# Patient Record
Sex: FEMALE | Race: WHITE | NOT HISPANIC OR LATINO | Employment: OTHER | URBAN - METROPOLITAN AREA
[De-identification: names, ages, dates, MRNs, and addresses within clinical notes are randomized per-mention and may not be internally consistent; named-entity substitution may affect disease eponyms.]

---

## 2017-03-20 PROBLEM — M47.812 OSTEOARTHRITIS OF CERVICAL SPINE: Status: ACTIVE | Noted: 2017-03-20

## 2017-03-20 PROBLEM — E89.0 HISTORY OF PARTIAL THYROIDECTOMY: Status: ACTIVE | Noted: 2017-03-20

## 2017-03-20 PROBLEM — R09.89 CHOKING SENSATION: Status: ACTIVE | Noted: 2017-03-20

## 2017-03-20 PROBLEM — R63.2 INCREASED APPETITE: Status: ACTIVE | Noted: 2017-03-20

## 2017-03-20 PROBLEM — E04.1 THYROID NODULE: Status: ACTIVE | Noted: 2017-03-20

## 2017-03-30 PROBLEM — J02.9 SORE THROAT: Status: ACTIVE | Noted: 2017-03-30

## 2017-03-30 PROBLEM — R49.0 HOARSENESS OF VOICE: Status: ACTIVE | Noted: 2017-03-30

## 2017-03-30 PROBLEM — J34.89 RHINORRHEA: Status: ACTIVE | Noted: 2017-03-30

## 2017-03-30 PROBLEM — J34.89 SINUS PRESSURE: Status: ACTIVE | Noted: 2017-03-30

## 2017-03-30 PROBLEM — J04.0 LARYNGITIS: Status: ACTIVE | Noted: 2017-03-30

## 2017-04-18 PROBLEM — J34.89 SINUS PRESSURE: Status: RESOLVED | Noted: 2017-03-30 | Resolved: 2017-04-18

## 2017-04-18 PROBLEM — J04.0 LARYNGITIS: Status: RESOLVED | Noted: 2017-03-30 | Resolved: 2017-04-18

## 2017-04-18 PROBLEM — R49.0 HOARSENESS OF VOICE: Status: RESOLVED | Noted: 2017-03-30 | Resolved: 2017-04-18

## 2017-04-18 PROBLEM — J34.89 RHINORRHEA: Status: RESOLVED | Noted: 2017-03-30 | Resolved: 2017-04-18

## 2017-04-18 PROBLEM — J02.9 SORE THROAT: Status: RESOLVED | Noted: 2017-03-30 | Resolved: 2017-04-18

## 2017-11-02 PROBLEM — R63.2 INCREASED APPETITE: Status: RESOLVED | Noted: 2017-03-20 | Resolved: 2017-11-02

## 2019-03-20 ENCOUNTER — HOSPITAL ENCOUNTER (OUTPATIENT)
Dept: RADIOLOGY | Facility: HOSPITAL | Age: 64
Discharge: HOME | End: 2019-03-20
Attending: NURSE PRACTITIONER
Payer: COMMERCIAL

## 2019-03-20 VITALS — WEIGHT: 125 LBS | BODY MASS INDEX: 23 KG/M2 | HEIGHT: 62 IN

## 2019-03-20 DIAGNOSIS — Z12.39 ENCOUNTER FOR SCREENING FOR MALIGNANT NEOPLASM OF BREAST: ICD-10-CM

## 2019-03-20 PROCEDURE — 77067 SCR MAMMO BI INCL CAD: CPT | Mod: TC

## 2019-03-20 PROCEDURE — 77067 SCR MAMMO BI INCL CAD: CPT | Mod: 26,,, | Performed by: RADIOLOGY

## 2019-03-20 PROCEDURE — 77067 MAMMO DIGITAL SCREENING BILAT WITH CAD: ICD-10-PCS | Mod: 26,,, | Performed by: RADIOLOGY

## 2019-04-02 ENCOUNTER — HOSPITAL ENCOUNTER (OUTPATIENT)
Dept: RADIOLOGY | Facility: HOSPITAL | Age: 64
Discharge: HOME | End: 2019-04-02
Attending: NURSE PRACTITIONER
Payer: COMMERCIAL

## 2019-04-02 DIAGNOSIS — E04.1 THYROID NODULE: ICD-10-CM

## 2019-04-02 PROCEDURE — 76536 US THYROID: ICD-10-PCS | Mod: 26,,, | Performed by: RADIOLOGY

## 2019-04-02 PROCEDURE — 76536 US EXAM OF HEAD AND NECK: CPT | Mod: TC

## 2019-04-02 PROCEDURE — 76536 US EXAM OF HEAD AND NECK: CPT | Mod: 26,,, | Performed by: RADIOLOGY

## 2019-05-23 ENCOUNTER — OFFICE VISIT (OUTPATIENT)
Dept: SURGERY | Facility: CLINIC | Age: 64
End: 2019-05-23
Payer: COMMERCIAL

## 2019-05-23 VITALS
HEIGHT: 62 IN | HEART RATE: 75 BPM | SYSTOLIC BLOOD PRESSURE: 101 MMHG | RESPIRATION RATE: 18 BRPM | WEIGHT: 128.5 LBS | DIASTOLIC BLOOD PRESSURE: 57 MMHG | TEMPERATURE: 97 F | OXYGEN SATURATION: 96 % | BODY MASS INDEX: 23.65 KG/M2

## 2019-05-23 DIAGNOSIS — E04.2 MULTIPLE THYROID NODULES: Primary | ICD-10-CM

## 2019-05-23 PROCEDURE — 99205 PR OFFICE/OUTPT VISIT, NEW, LEVL V, 60-74 MIN: ICD-10-PCS | Mod: S$GLB,,, | Performed by: SURGERY

## 2019-05-23 PROCEDURE — 99205 OFFICE O/P NEW HI 60 MIN: CPT | Mod: S$GLB,,, | Performed by: SURGERY

## 2019-05-23 NOTE — LETTER
May 23, 2019      Venus Coronado, NUNU-C  [address]  [place]  [place] [address]           [facility]  [address] [place] [address]  Phone: [phone]  Fax: [phone]          Patient: Liliam Woodson   MR Number: [id number]   YOB: 1955   Date of Visit: 5/23/2019       Dear Venus Coronado:    Thank you for referring Liliam Woodson to me for evaluation. Attached you will find relevant portions of my assessment and plan of care.    If you have questions, please do not hesitate to call me. I look forward to following Liliam Woodson along with you.    Sincerely,    El Leonard MD    Enclosure  CC:  No Recipients    If you would like to receive this communication electronically, please contact [email or web address] or [phone] to request more information on [employer] access.    For providers and/or their staff who would like to refer a patient to [facility], please contact us through our one-stop-shop provider referral line, [facility] , at [phone].    If you feel you have received this communication in error or would no longer like to receive these types of communications, please e-mail [email or web address]

## 2019-05-23 NOTE — PROGRESS NOTES
Subjective:       Patient ID: Liliam Woodson is a 64 y.o. female.    Chief Complaint: Consult (Referral-Cliff,NP-Multiple Thyroid Nodules)      HPI:  Ms. Woodson presents today as a consult from Venus FLORES.  She is seen in consultation for an abnormal ultrasound of her thyroid.  She had a left thyroid lobectomy 30 or 40 years ago, pathology unavailable and unknown.  She has been followed with thyroid ultrasounds for known multiple nodules in the right lobe.  Thyroid ultrasound films and report were reviewed from 4/2/2019.  The left lobe is indeed surgically absent.  Multiple nodules are present in the right lobe.  Largest measures 3.2 cm in maximal diameter, this has increased in size.  In 2016 this nodule measured 2.4 cm and in 2017 it measured 2.8 cm She is not experiencing any compressive symptoms.  She is not experiencing any symptoms of hyperthyroidism or hypothyroidism.      Allergies & Meds:  Review of patient's allergies indicates:   Allergen Reactions    Lyrica [pregabalin] Nausea And Vomiting    Pcn [penicillins]        Current Outpatient Medications   Medication Sig Dispense Refill    cholecalciferol, vitamin D3, 1,000 unit capsule Take 2 capsules (2,000 Units total) by mouth once daily.  0    fluticasone-salmeterol 250-50 mcg/dose (ADVAIR DISKUS) 250-50 mcg/dose diskus inhaler INHALE 1 SPRAY BY MOUTH 2 TIMES A DAY ( IN THE MORNING AND IN THE EVENING) FOR ASTHMA 60 each 11    omeprazole (PRILOSEC) 40 MG capsule Take 1 capsule (40 mg total) by mouth once daily. 30 capsule 11    oxycodone-acetaminophen (PERCOCET)  mg per tablet Take 1 tablet by mouth every 12 (twelve) hours as needed for Pain.      sertraline (ZOLOFT) 100 MG tablet Take 1 tablet (100 mg total) by mouth once daily. 30 tablet 5    ALPRAZolam (XANAX) 0.25 MG tablet Take 1 tablet (0.25 mg total) by mouth 2 (two) times daily as needed for Anxiety. 10 tablet 1     No current facility-administered medications for this  "visit.        PMFSHx:  Past Medical History:   Diagnosis Date    Allergy     Asthma     Compression fx, thoracic spine     Osteopenia 02/15/2018    Thyroid disease        Past Surgical History:   Procedure Laterality Date    CATARACT EXTRACTION Right 03/2018    w/ lens implant    CHOLECYSTECTOMY      COLONOSCOPY  7/26/10    "inflammed hyperplastic polyp"    GALLBLADDER SURGERY      KNEE SURGERY      OVARIAN CYST SURGERY      THYROID SURGERY         Family History   Problem Relation Age of Onset    Lupus Mother     Melanoma Neg Hx     Psoriasis Neg Hx     Breast cancer Neg Hx        Social History     Tobacco Use    Smoking status: Never Smoker   Substance Use Topics    Alcohol use: No    Drug use: No       Review of Systems   Constitutional: Negative for appetite change, chills, fatigue, fever and unexpected weight change.   HENT: Negative for congestion, dental problem, ear pain, mouth sores, postnasal drip, rhinorrhea, sore throat, tinnitus, trouble swallowing and voice change.    Eyes: Negative for photophobia, pain, discharge and visual disturbance.   Respiratory: Negative for cough, chest tightness, shortness of breath and wheezing.    Cardiovascular: Negative for chest pain, palpitations and leg swelling.   Gastrointestinal: Negative for abdominal pain, blood in stool, constipation, diarrhea, nausea and vomiting.   Endocrine: Negative for cold intolerance, heat intolerance, polydipsia, polyphagia and polyuria.   Genitourinary: Negative for difficulty urinating, dysuria, flank pain, frequency, hematuria and urgency.   Musculoskeletal: Negative for arthralgias, joint swelling and myalgias.   Skin: Negative for color change and rash.   Allergic/Immunologic: Negative for immunocompromised state.   Neurological: Negative for dizziness, tremors, seizures, syncope, speech difficulty, weakness, numbness and headaches.   Hematological: Negative for adenopathy. Does not bruise/bleed easily. "   Psychiatric/Behavioral: Negative for agitation, confusion, hallucinations, self-injury and suicidal ideas. The patient is not nervous/anxious.        Objective:      Physical Exam   Constitutional: She is oriented to person, place, and time. She appears well-developed and well-nourished. She is active.  Non-toxic appearance. No distress.   Body mass index is 23.5 kg/m².     HENT:   Head: Normocephalic and atraumatic.   Right Ear: Hearing and external ear normal. No drainage or tenderness.   Left Ear: Hearing and external ear normal. No drainage or tenderness.   Nose: Nose normal. No rhinorrhea. No epistaxis.   Mouth/Throat: Uvula is midline, oropharynx is clear and moist and mucous membranes are normal. Mucous membranes are not pale, not dry and not cyanotic. No oropharyngeal exudate.   Eyes: Pupils are equal, round, and reactive to light. Conjunctivae and lids are normal. Right eye exhibits no discharge and no exudate. Left eye exhibits no discharge and no exudate. Right conjunctiva is not injected. Right eye exhibits no nystagmus. Left eye exhibits no nystagmus.   Neck: Trachea normal, full passive range of motion without pain and phonation normal. No JVD present. Carotid bruit is not present. No tracheal deviation present. Thyroid mass (left lobe surgically absent; nodule inferior right lobe (3 cm)) present. No thyromegaly present.   Cardiovascular: Normal rate, regular rhythm, S1 normal, S2 normal, normal heart sounds and intact distal pulses. PMI is not displaced. Exam reveals no gallop and no friction rub.   No murmur heard.  Pulmonary/Chest: Effort normal and breath sounds normal. No accessory muscle usage. No respiratory distress. She exhibits no mass, no tenderness and no crepitus. Right breast exhibits no inverted nipple, no mass, no nipple discharge, no skin change and no tenderness. Left breast exhibits no inverted nipple, no mass, no nipple discharge, no skin change and no tenderness. Breasts are  symmetrical.   Abdominal: Soft. Normal appearance and bowel sounds are normal. She exhibits no distension, no fluid wave, no abdominal bruit and no mass. There is no hepatosplenomegaly. There is no tenderness. There is no rebound, no guarding and negative Claros's sign. No hernia. Hernia confirmed negative in the right inguinal area and confirmed negative in the left inguinal area.   Genitourinary: Rectum normal and vagina normal. There is no rash or lesion on the right labia. There is no rash or lesion on the left labia. Right adnexum displays no mass. Left adnexum displays no mass. No vaginal discharge found.   Musculoskeletal:        Cervical back: Normal.        Thoracic back: Normal.        Lumbar back: Normal.        Right upper arm: Normal.        Left upper arm: Normal.        Right forearm: Normal.        Left forearm: Normal.        Right hand: Normal.        Left hand: Normal.        Right upper leg: Normal.        Left upper leg: Normal.        Right lower leg: Normal.        Left lower leg: Normal.        Right foot: Normal.        Left foot: Normal.   Lymphadenopathy:        Head (right side): No submental, no submandibular and no posterior auricular adenopathy present.        Head (left side): No submental, no submandibular and no posterior auricular adenopathy present.     She has no cervical adenopathy.     She has no axillary adenopathy.        Right: No inguinal and no supraclavicular adenopathy present.        Left: No inguinal and no supraclavicular adenopathy present.   Neurological: She is alert and oriented to person, place, and time. She has normal strength. No cranial nerve deficit or sensory deficit. Coordination and gait normal. GCS eye subscore is 4. GCS verbal subscore is 5. GCS motor subscore is 6.   Skin: Skin is warm, dry and intact. No rash noted. No cyanosis. Nails show no clubbing.   Psychiatric: She has a normal mood and affect. Her speech is normal. Judgment and thought content  normal. Her mood appears not anxious. Her affect is not inappropriate. She is not actively hallucinating. She does not exhibit a depressed mood.         Test Results:  Lab results were reviewed from 2/8/2019.  CBC showed no evidence of leukocytosis, anemia, or thrombocytopenia.  Electrolytes were all in the range of normal. BUN and creatinine showed no evidence of renal dysfunction.  Glucose was minimally elevated.  Liver profile showed no evidence of hepatocellular disease or biliary obstruction.  Hemoglobin A1c shows no evidence of diabetes, 5.0%.  TSH was in the range of normal at 1.11.    Assessment:       Multiple thyroid nodules.  Dominant nodule slowly increasing in size.  Differential diagnosis includes was not limited to adenoma, goiter, cancer, etc    1. Multiple thyroid nodules        Plan:   Multiple thyroid nodules  -     US FNA Biopsy w/ Imaging 1st Lesion; Future; Expected date: 05/23/2019  -     TSH; Future; Expected date: 05/23/2019  -     T3, free; Future; Expected date: 05/23/2019  -     T4, free; Future; Expected date: 05/23/2019        Follow up in about 2 weeks (around 6/6/2019) for results.

## 2019-06-07 ENCOUNTER — HOSPITAL ENCOUNTER (OUTPATIENT)
Dept: RADIOLOGY | Facility: HOSPITAL | Age: 64
Discharge: HOME | End: 2019-06-07
Attending: SURGERY
Payer: COMMERCIAL

## 2019-06-07 VITALS — BODY MASS INDEX: 22.15 KG/M2 | WEIGHT: 125 LBS | HEIGHT: 63 IN

## 2019-06-07 DIAGNOSIS — E04.2 MULTIPLE THYROID NODULES: ICD-10-CM

## 2019-06-07 PROCEDURE — 10021 FNA BX W/O IMG GDN 1ST LES: CPT | Mod: ,,, | Performed by: PATHOLOGY

## 2019-06-07 PROCEDURE — 88173 CYTOLOGY SPECIMEN-FNA-RADIOLOGY ASSISTED WITH PATH ADEQUACY-CORE BX: ICD-10-PCS | Mod: 26,,, | Performed by: PATHOLOGY

## 2019-06-07 PROCEDURE — 10021 CYTOLOGY SPECIMEN-FNA-RADIOLOGY ASSISTED WITH PATH ADEQUACY-CORE BX: ICD-10-PCS | Mod: ,,, | Performed by: PATHOLOGY

## 2019-06-07 PROCEDURE — 88173 CYTOPATH EVAL FNA REPORT: CPT | Mod: 26,,, | Performed by: PATHOLOGY

## 2019-06-07 PROCEDURE — 10005 US FINE NEEDLE ASPIRATION BIOPSY, FIRST LESION: ICD-10-PCS | Mod: ,,, | Performed by: RADIOLOGY

## 2019-06-07 PROCEDURE — 10005 FNA BX W/US GDN 1ST LES: CPT | Mod: ,,, | Performed by: RADIOLOGY

## 2019-06-07 PROCEDURE — 88173 CYTOPATH EVAL FNA REPORT: CPT | Performed by: PATHOLOGY

## 2019-06-07 PROCEDURE — 10005 FNA BX W/US GDN 1ST LES: CPT

## 2019-06-07 NOTE — NURSING
Right thyroid aspiration/specimen collected at this time pathology tech in room with slides ready aspirate placed on slides at this time.

## 2019-06-18 ENCOUNTER — OFFICE VISIT (OUTPATIENT)
Dept: SURGERY | Facility: CLINIC | Age: 64
End: 2019-06-18
Payer: COMMERCIAL

## 2019-06-18 VITALS
TEMPERATURE: 96 F | DIASTOLIC BLOOD PRESSURE: 59 MMHG | BODY MASS INDEX: 23.04 KG/M2 | WEIGHT: 130 LBS | SYSTOLIC BLOOD PRESSURE: 113 MMHG | OXYGEN SATURATION: 97 % | RESPIRATION RATE: 18 BRPM | HEIGHT: 63 IN | HEART RATE: 80 BPM

## 2019-06-18 DIAGNOSIS — E04.1 THYROID NODULE: Primary | ICD-10-CM

## 2019-06-18 PROCEDURE — 99213 PR OFFICE/OUTPT VISIT, EST, LEVL III, 20-29 MIN: ICD-10-PCS | Mod: S$GLB,,, | Performed by: SURGERY

## 2019-06-18 PROCEDURE — 99213 OFFICE O/P EST LOW 20 MIN: CPT | Mod: S$GLB,,, | Performed by: SURGERY

## 2019-06-18 NOTE — PROGRESS NOTES
Subjective:       Patient ID: Liliam Woodson is a 64 y.o. female.    Chief Complaint: Follow-up (Thyroid Bx/Labs)      HPI:  Ms. Woodson returns today with no complaints.  She was seen on 5/23/2019 for an abnormal thyroid ultrasound.  She had an ultrasound in April that showed multiple nodules in the right lobe of her thyroid, largest measures 3.2 cm in maximal diameter.  The left lower thyroid was surgically removed years ago.  The dominant nodule in the right lobe has varied in size but appears to be slowly enlarging.  In the interval since her last visit she had TFTs that indicates she is euthyroid.  She also had FNA biopsy of the dominant nodule that shows benign cytology.  She is not experiencing any symptoms of hyperthyroidism or hypothyroidism.  She is not experiencing any compressive symptoms.    Cytology report reviewed from 6/7/2019 with the above findings confirmed.    TFTs reviewed from 6/7/2019 with the above findings confirmed.      Allergies & Meds:  Review of patient's allergies indicates:   Allergen Reactions    Lyrica [pregabalin] Nausea And Vomiting    Pcn [penicillins] Rash       Current Outpatient Medications   Medication Sig Dispense Refill    cholecalciferol, vitamin D3, 1,000 unit capsule Take 2 capsules (2,000 Units total) by mouth once daily.  0    fluticasone-salmeterol 250-50 mcg/dose (ADVAIR DISKUS) 250-50 mcg/dose diskus inhaler INHALE 1 SPRAY BY MOUTH 2 TIMES A DAY ( IN THE MORNING AND IN THE EVENING) FOR ASTHMA 60 each 11    oxycodone-acetaminophen (PERCOCET)  mg per tablet Take 1 tablet by mouth every 12 (twelve) hours as needed for Pain.      sertraline (ZOLOFT) 100 MG tablet Take 1 tablet (100 mg total) by mouth once daily. 30 tablet 5    ALPRAZolam (XANAX) 0.25 MG tablet Take 1 tablet (0.25 mg total) by mouth 2 (two) times daily as needed for Anxiety. 10 tablet 1     No current facility-administered medications for this visit.        PMFSHx:  Past medical history,  surgical history, family history, social history reviewed and no changes noted.        Review of Systems   Constitutional: Negative for appetite change, chills, fatigue, fever and unexpected weight change.   HENT: Negative for congestion, dental problem, ear pain, mouth sores, postnasal drip, rhinorrhea, sore throat, tinnitus, trouble swallowing and voice change.    Eyes: Negative for photophobia, pain, discharge and visual disturbance.   Respiratory: Negative for cough, chest tightness, shortness of breath and wheezing.    Cardiovascular: Negative for chest pain, palpitations and leg swelling.   Gastrointestinal: Negative for abdominal pain, blood in stool, constipation, diarrhea, nausea and vomiting.   Endocrine: Negative for cold intolerance, heat intolerance, polydipsia, polyphagia and polyuria.   Genitourinary: Negative for difficulty urinating, dysuria, flank pain, frequency, hematuria and urgency.   Musculoskeletal: Negative for arthralgias, joint swelling and myalgias.   Skin: Negative for color change and rash.   Allergic/Immunologic: Negative for immunocompromised state.   Neurological: Negative for dizziness, tremors, seizures, syncope, speech difficulty, weakness, numbness and headaches.   Hematological: Negative for adenopathy. Does not bruise/bleed easily.   Psychiatric/Behavioral: Negative for agitation, confusion, hallucinations, self-injury and suicidal ideas. The patient is not nervous/anxious.        Objective:      Physical Exam   Constitutional: She appears well-developed and well-nourished.  Non-toxic appearance. She does not appear ill. No distress.   Neck: Trachea normal and phonation normal. No tracheal tenderness present. No tracheal deviation present. Thyroid mass (3 cm nodule inferior pole right lobe, left lobe surgically absent) present.   Cardiovascular: Normal rate, regular rhythm and normal heart sounds. PMI is not displaced. Exam reveals no gallop.   No murmur  heard.  Pulmonary/Chest: Effort normal and breath sounds normal. No accessory muscle usage. No tachypnea. No respiratory distress.   Abdominal: Soft. Normal appearance and bowel sounds are normal. There is no tenderness. No hernia.   Skin: Skin is warm, dry and intact. No rash noted.         Test Results:  See above      Assessment:       Nontoxic multinodular goiter.  Dominant nodule biopsied and found benign.  Options at this time would include thyroidectomy or continued observation.  Risks of surgery appear to outweigh benefits at this time.    1. Thyroid nodule        Plan:   Thyroid nodule        Follow up for thyroid ultrasound & TFTs w/ PCP in 1 yr, any concerns or questions as needed, resume care with PCP.    Counseling/Medical Decision Making:  Options were discussed at length. Options discussed included not limited to observation with annual ultrasound and TFTs versus completion thyroidectomy.  Risks of laryngeal nerve injury increased considerably with repeat surgery. Recommendations were expressed for continued observation with annual ultrasound.  Questions solicited and answered.  She voiced understanding.  She concurs with the recommendations and desires to proceed with annual ultrasound surveillance with simultaneous annual TFTs.  She understands to call sooner for any symptoms of hyperthyroidism, hypothyroidism, or compressive symptoms.  All of the signs and symptoms were discussed in great detail.    Total face to face encounter time was 40 minutes, 30 minutes spent counseling as outlined/summarized above.

## 2019-09-10 PROBLEM — E78.00 HYPERCHOLESTEROLEMIA: Status: ACTIVE | Noted: 2019-09-10

## 2019-09-16 ENCOUNTER — HOSPITAL ENCOUNTER (OUTPATIENT)
Dept: RADIOLOGY | Facility: HOSPITAL | Age: 64
Discharge: HOME | End: 2019-09-16
Attending: NURSE PRACTITIONER
Payer: COMMERCIAL

## 2019-09-16 PROCEDURE — 76700 US EXAM ABDOM COMPLETE: CPT | Mod: 26,,, | Performed by: RADIOLOGY

## 2019-09-16 PROCEDURE — 76700 US ABDOMEN COMPLETE: ICD-10-PCS | Mod: 26,,, | Performed by: RADIOLOGY

## 2019-09-16 PROCEDURE — 76700 US EXAM ABDOM COMPLETE: CPT | Mod: TC,PN

## 2020-02-26 PROBLEM — M85.80 OSTEOPENIA: Status: ACTIVE | Noted: 2020-02-26

## 2020-03-05 DIAGNOSIS — M25.561 RIGHT KNEE PAIN, UNSPECIFIED CHRONICITY: Primary | ICD-10-CM

## 2020-03-09 ENCOUNTER — OFFICE VISIT (OUTPATIENT)
Dept: ORTHOPEDICS | Facility: CLINIC | Age: 65
End: 2020-03-09
Payer: COMMERCIAL

## 2020-03-09 ENCOUNTER — HOSPITAL ENCOUNTER (OUTPATIENT)
Dept: RADIOLOGY | Facility: HOSPITAL | Age: 65
Discharge: HOME | End: 2020-03-09
Attending: ORTHOPAEDIC SURGERY
Payer: COMMERCIAL

## 2020-03-09 VITALS
DIASTOLIC BLOOD PRESSURE: 70 MMHG | HEART RATE: 75 BPM | BODY MASS INDEX: 23.92 KG/M2 | SYSTOLIC BLOOD PRESSURE: 140 MMHG | WEIGHT: 135 LBS | HEIGHT: 63 IN

## 2020-03-09 DIAGNOSIS — M25.561 RIGHT KNEE PAIN, UNSPECIFIED CHRONICITY: ICD-10-CM

## 2020-03-09 DIAGNOSIS — M17.11 ARTHRITIS OF KNEE, RIGHT: Primary | ICD-10-CM

## 2020-03-09 PROCEDURE — 73564 XR KNEE ORTHO RIGHT WITH FLEXION: ICD-10-PCS | Mod: 26,RT,, | Performed by: RADIOLOGY

## 2020-03-09 PROCEDURE — 99999 PR PBB SHADOW E&M-EST. PATIENT-LVL III: ICD-10-PCS | Mod: PBBFAC,,, | Performed by: ORTHOPAEDIC SURGERY

## 2020-03-09 PROCEDURE — 99203 OFFICE O/P NEW LOW 30 MIN: CPT | Mod: S$GLB,,, | Performed by: ORTHOPAEDIC SURGERY

## 2020-03-09 PROCEDURE — 99203 PR OFFICE/OUTPT VISIT, NEW, LEVL III, 30-44 MIN: ICD-10-PCS | Mod: S$GLB,,, | Performed by: ORTHOPAEDIC SURGERY

## 2020-03-09 PROCEDURE — 73564 X-RAY EXAM KNEE 4 OR MORE: CPT | Mod: 26,RT,, | Performed by: RADIOLOGY

## 2020-03-09 PROCEDURE — 73562 X-RAY EXAM OF KNEE 3: CPT | Mod: 26,LT,, | Performed by: RADIOLOGY

## 2020-03-09 PROCEDURE — 99999 PR PBB SHADOW E&M-EST. PATIENT-LVL III: CPT | Mod: PBBFAC,,, | Performed by: ORTHOPAEDIC SURGERY

## 2020-03-09 PROCEDURE — 73562 X-RAY EXAM OF KNEE 3: CPT | Mod: TC,PN,LT

## 2020-03-09 PROCEDURE — 73562 XR KNEE ORTHO RIGHT WITH FLEXION: ICD-10-PCS | Mod: 26,LT,, | Performed by: RADIOLOGY

## 2020-03-09 NOTE — PROGRESS NOTES
"Past Medical History:   Diagnosis Date    Allergy     Anterolisthesis     Asthma     Bulging lumbar disc     Cervical spondylosis     Colon polyp, hyperplastic 07/26/2010    Compression fx, thoracic spine     DDD (degenerative disc disease), lumbar     Depression     Fibromyalgia     Gastritis 07/26/2010    Osteopenia 02/15/2018    Sjogren's syndrome     Thyroid disease     Thyroid nodule        Past Surgical History:   Procedure Laterality Date    CATARACT EXTRACTION Right 03/2018    w/ lens implant    CHOLECYSTECTOMY      COLONOSCOPY  7/26/10    "inflammed hyperplastic polyp"    GALLBLADDER SURGERY      KNEE SURGERY      OVARIAN CYST SURGERY      THYROID SURGERY         Current Outpatient Medications   Medication Sig    buPROPion (WELLBUTRIN XL) 150 MG TB24 tablet Take 1 tablet (150 mg total) by mouth once daily.    cholecalciferol, vitamin D3, 1,000 unit capsule Take 2 capsules (2,000 Units total) by mouth once daily.    fluticasone-salmeterol diskus inhaler 250-50 mcg INHALE 1 SPRAY BY MOUTH 2 TIMES A DAY ( IN THE MORNING AND IN THE EVENING) FOR ASTHMA    oxycodone-acetaminophen (PERCOCET)  mg per tablet Take 1 tablet by mouth every 12 (twelve) hours as needed for Pain.     No current facility-administered medications for this visit.        Review of patient's allergies indicates:   Allergen Reactions    Lyrica [pregabalin] Nausea And Vomiting    Pcn [penicillins] Rash       Family History   Problem Relation Age of Onset    Lupus Mother     Melanoma Neg Hx     Psoriasis Neg Hx     Breast cancer Neg Hx        Social History     Socioeconomic History    Marital status:      Spouse name: Not on file    Number of children: Not on file    Years of education: Not on file    Highest education level: Not on file   Occupational History    Occupation: Teacher     Employer: [employer]   Social Needs    Financial resource strain: Not on file    Food " insecurity:     Worry: Not on file     Inability: Not on file    Transportation needs:     Medical: Not on file     Non-medical: Not on file   Tobacco Use    Smoking status: Never Smoker   Substance and Sexual Activity    Alcohol use: No    Drug use: No    Sexual activity: Not on file   Lifestyle    Physical activity:     Days per week: Not on file     Minutes per session: Not on file    Stress: Not on file   Relationships    Social connections:     Talks on phone: Not on file     Gets together: Not on file     Attends [employer] service: Not on file     Active member of club or organization: Not on file     Attends meetings of clubs or organizations: Not on file     Relationship status: Not on file   Other Topics Concern    Are you pregnant or think you may be? Not Asked    Breast-feeding Not Asked   Social History Narrative    Not on file       Chief Complaint:   Chief Complaint   Patient presents with    Right Knee - Pain, Initial Visit     Right Knee Pain for many years.        Consulting Physician: Self, Aaareferral    History of present illness:    This is a 65 y.o. year old female who complains of right knee pain for years.  She recently started an exercise program and has some increased pain.  She puts her pain at 3/10 worse with activities.  She reports 2 knee surgeries many years ago.  No recent injury.    Review of Systems:    Constitution:   Denies chills, fever, and sweats.  HENT:   Denies headaches or blurry vision.  Cardiovascular:  Denies chest pain or irregular heart beat.  Respiratory:   Denies cough or shortness of breath.  Gastrointestinal:  Denies abdominal pain, nausea, or vomiting.  Musculoskeletal:   Denies muscle cramps.  Neurological:   Denies dizziness or focal weakness.  Psychiatric/Behavior: Normal mental status.  Hematology/Lymph:  Denies bleeding problem or easy bruising/bleeding.  Skin:    Denies rash or suspicious lesions.    Examination:    Vital Signs:    Vitals:     "03/09/20 1525   BP: (!) 140/70   Pulse: 75   Weight: 61.2 kg (135 lb)   Height: 5' 3" (1.6 m)   PainSc:   3   PainLoc: Knee       Body mass index is 23.91 kg/m².    Constitution:   Well-developed, well nourished patient in no acute distress.  Neurological:   Alert and oriented x 3 and cooperative to examination.     Psychiatric/Behavior: Normal mental status.  Respiratory:   No shortness of breath.  Eyes:    Extraoccular muscles intact  Skin:    No scars, rash or suspicious lesions.    Physical Exam: Right Knee Exam    Gait   Antalgic    Skin  Rash:   None  Scars:   None    Inspection  Erythema:  None  Bruising:  None  Effusion:  None  Masses:  None  Lymphadenopathy: None    Range of Motion: 0 to 130°    Medial Joint : Yes  Lateral Joint : Mild    Patellofemoral Tenderness: Yes  Patellofemoral Crepitus: Yes    Lachman:  Normal  Anterior Drawer: Normal  Posterior Drawer: Normal    Valeria's:  Negative  Apley's:  Negative    Varus Stress:  Stable  Valgus Stress:  Stable    Strength:  5/5    Pulses:  Palpable  Sensation:  Intact          Imaging: X-rays ordered and images interpreted today personally by me of right knee shows moderate to severe degenerative changes        Assessment: Arthritis of knee, right        Plan:  We discussed treatment options.  She declined injections.  Will get her into a brace for stability.  We will see her back as needed.  She is not interested in surgery.      DISCLAIMER: This note may have been dictated using voice recognition software and may contain grammatical errors.     NOTE: Consult report sent to referring provider via [facility] EMR.  "

## 2020-07-28 ENCOUNTER — HOSPITAL ENCOUNTER (OUTPATIENT)
Dept: RADIOLOGY | Facility: HOSPITAL | Age: 65
Discharge: HOME | End: 2020-07-28
Attending: NURSE PRACTITIONER
Payer: COMMERCIAL

## 2020-07-28 VITALS — WEIGHT: 132.06 LBS | BODY MASS INDEX: 24.3 KG/M2 | HEIGHT: 62 IN

## 2020-07-28 DIAGNOSIS — E04.2 MULTIPLE THYROID NODULES: ICD-10-CM

## 2020-07-28 DIAGNOSIS — Z78.0 ASYMPTOMATIC MENOPAUSAL STATE: ICD-10-CM

## 2020-07-28 DIAGNOSIS — Z12.31 ENCOUNTER FOR SCREENING MAMMOGRAM FOR MALIGNANT NEOPLASM OF BREAST: ICD-10-CM

## 2020-07-28 DIAGNOSIS — M85.80 OSTEOPENIA, UNSPECIFIED LOCATION: ICD-10-CM

## 2020-07-28 PROCEDURE — 76536 US THYROID: ICD-10-PCS | Mod: 26,,, | Performed by: RADIOLOGY

## 2020-07-28 PROCEDURE — 77080 DXA BONE DENSITY AXIAL: CPT | Mod: 26,,, | Performed by: RADIOLOGY

## 2020-07-28 PROCEDURE — 77080 DXA BONE DENSITY AXIAL: CPT | Mod: TC

## 2020-07-28 PROCEDURE — 76536 US EXAM OF HEAD AND NECK: CPT | Mod: 26,,, | Performed by: RADIOLOGY

## 2020-07-28 PROCEDURE — 77067 SCR MAMMO BI INCL CAD: CPT | Mod: 26,,, | Performed by: RADIOLOGY

## 2020-07-28 PROCEDURE — 77063 BREAST TOMOSYNTHESIS BI: CPT | Mod: 26,,, | Performed by: RADIOLOGY

## 2020-07-28 PROCEDURE — 77063 MAMMO DIGITAL SCREENING BILAT WITH TOMOSYNTHESIS_CAD: ICD-10-PCS | Mod: 26,,, | Performed by: RADIOLOGY

## 2020-07-28 PROCEDURE — 77067 SCR MAMMO BI INCL CAD: CPT | Mod: TC

## 2020-07-28 PROCEDURE — 77067 MAMMO DIGITAL SCREENING BILAT WITH TOMOSYNTHESIS_CAD: ICD-10-PCS | Mod: 26,,, | Performed by: RADIOLOGY

## 2020-07-28 PROCEDURE — 76536 US EXAM OF HEAD AND NECK: CPT | Mod: TC

## 2020-07-28 PROCEDURE — 77080 DEXA BONE DENSITY SPINE HIP: ICD-10-PCS | Mod: 26,,, | Performed by: RADIOLOGY

## 2020-08-11 ENCOUNTER — HOSPITAL ENCOUNTER (OUTPATIENT)
Dept: RADIOLOGY | Facility: HOSPITAL | Age: 65
Discharge: HOME | End: 2020-08-11
Attending: NURSE PRACTITIONER
Payer: COMMERCIAL

## 2020-08-11 DIAGNOSIS — R41.3 MEMORY DIFFICULTY: ICD-10-CM

## 2020-08-11 DIAGNOSIS — M85.80 OSTEOPENIA, UNSPECIFIED LOCATION: ICD-10-CM

## 2020-08-11 DIAGNOSIS — E89.0 HISTORY OF PARTIAL THYROIDECTOMY: ICD-10-CM

## 2020-08-11 DIAGNOSIS — F32.A DEPRESSION, UNSPECIFIED DEPRESSION TYPE: ICD-10-CM

## 2020-08-11 DIAGNOSIS — I35.1 AORTIC VALVE INSUFFICIENCY, ETIOLOGY OF CARDIAC VALVE DISEASE UNSPECIFIED: ICD-10-CM

## 2020-08-11 DIAGNOSIS — E04.2 MULTIPLE THYROID NODULES: ICD-10-CM

## 2020-08-11 DIAGNOSIS — E78.00 HYPERCHOLESTEROLEMIA: ICD-10-CM

## 2020-08-11 PROCEDURE — 70450 CT HEAD/BRAIN W/O DYE: CPT | Mod: 26,,, | Performed by: RADIOLOGY

## 2020-08-11 PROCEDURE — 70450 CT HEAD WITHOUT CONTRAST: ICD-10-PCS | Mod: 26,,, | Performed by: RADIOLOGY

## 2020-08-11 PROCEDURE — 93880 US CAROTID BILATERAL: ICD-10-PCS | Mod: 26,,, | Performed by: RADIOLOGY

## 2020-08-11 PROCEDURE — 70450 CT HEAD/BRAIN W/O DYE: CPT | Mod: TC

## 2020-08-11 PROCEDURE — 93880 EXTRACRANIAL BILAT STUDY: CPT | Mod: 26,,, | Performed by: RADIOLOGY

## 2020-08-11 PROCEDURE — 93880 EXTRACRANIAL BILAT STUDY: CPT | Mod: TC

## 2021-02-18 ENCOUNTER — HOSPITAL ENCOUNTER (OUTPATIENT)
Dept: RADIOLOGY | Facility: HOSPITAL | Age: 66
Discharge: HOME | End: 2021-02-18
Attending: NURSE PRACTITIONER
Payer: COMMERCIAL

## 2021-02-18 DIAGNOSIS — E89.0 HISTORY OF PARTIAL THYROIDECTOMY: ICD-10-CM

## 2021-02-18 DIAGNOSIS — E04.2 MULTIPLE THYROID NODULES: ICD-10-CM

## 2021-02-18 PROCEDURE — 76536 US EXAM OF HEAD AND NECK: CPT | Mod: 26,,, | Performed by: RADIOLOGY

## 2021-02-18 PROCEDURE — 76536 US EXAM OF HEAD AND NECK: CPT | Mod: TC

## 2021-02-18 PROCEDURE — 76536 US THYROID: ICD-10-PCS | Mod: 26,,, | Performed by: RADIOLOGY

## 2021-07-14 ENCOUNTER — HOSPITAL ENCOUNTER (EMERGENCY)
Facility: HOSPITAL | Age: 66
Discharge: HOME | End: 2021-07-14
Attending: EMERGENCY MEDICINE
Payer: COMMERCIAL

## 2021-07-14 VITALS
WEIGHT: 125 LBS | OXYGEN SATURATION: 100 % | HEART RATE: 65 BPM | TEMPERATURE: 98 F | SYSTOLIC BLOOD PRESSURE: 112 MMHG | RESPIRATION RATE: 14 BRPM | HEIGHT: 62 IN | BODY MASS INDEX: 23 KG/M2 | DIASTOLIC BLOOD PRESSURE: 88 MMHG

## 2021-07-14 DIAGNOSIS — R10.9 FLANK PAIN: Primary | ICD-10-CM

## 2021-07-14 LAB
ALBUMIN SERPL BCP-MCNC: 4.2 G/DL (ref 3.5–5.2)
ALP SERPL-CCNC: 46 U/L (ref 55–135)
ALT SERPL W/O P-5'-P-CCNC: 32 U/L (ref 10–44)
ANION GAP SERPL CALC-SCNC: 14 MMOL/L (ref 8–16)
AST SERPL-CCNC: 31 U/L (ref 10–40)
BASOPHILS # BLD AUTO: 0.03 K/UL (ref 0–0.2)
BASOPHILS NFR BLD: 0.4 % (ref 0–1.9)
BILIRUB SERPL-MCNC: 0.4 MG/DL (ref 0.1–1)
BILIRUB UR QL STRIP: NEGATIVE
BUN SERPL-MCNC: 15 MG/DL (ref 8–23)
CALCIUM SERPL-MCNC: 9.7 MG/DL (ref 8.7–10.5)
CHLORIDE SERPL-SCNC: 106 MMOL/L (ref 95–110)
CLARITY UR: CLEAR
CO2 SERPL-SCNC: 24 MMOL/L (ref 23–29)
COLOR UR: YELLOW
CREAT SERPL-MCNC: 0.9 MG/DL (ref 0.5–1.4)
DIFFERENTIAL METHOD: NORMAL
EOSINOPHIL # BLD AUTO: 0.1 K/UL (ref 0–0.5)
EOSINOPHIL NFR BLD: 1.1 % (ref 0–8)
ERYTHROCYTE [DISTWIDTH] IN BLOOD BY AUTOMATED COUNT: 12.3 % (ref 11.5–14.5)
EST. GFR  (AFRICAN AMERICAN): >60 ML/MIN/1.73 M^2
EST. GFR  (NON AFRICAN AMERICAN): >60 ML/MIN/1.73 M^2
GLUCOSE SERPL-MCNC: 100 MG/DL (ref 70–110)
GLUCOSE UR QL STRIP: NEGATIVE
HCT VFR BLD AUTO: 44.1 % (ref 37–48.5)
HGB BLD-MCNC: 14.9 G/DL (ref 12–16)
HGB UR QL STRIP: ABNORMAL
IMM GRANULOCYTES # BLD AUTO: 0.04 K/UL (ref 0–0.04)
IMM GRANULOCYTES NFR BLD AUTO: 0.5 % (ref 0–0.5)
KETONES UR QL STRIP: ABNORMAL
LEUKOCYTE ESTERASE UR QL STRIP: NEGATIVE
LYMPHOCYTES # BLD AUTO: 2.8 K/UL (ref 1–4.8)
LYMPHOCYTES NFR BLD: 37 % (ref 18–48)
MCH RBC QN AUTO: 30 PG (ref 27–31)
MCHC RBC AUTO-ENTMCNC: 33.8 G/DL (ref 32–36)
MCV RBC AUTO: 89 FL (ref 82–98)
MONOCYTES # BLD AUTO: 0.6 K/UL (ref 0.3–1)
MONOCYTES NFR BLD: 7.8 % (ref 4–15)
NEUTROPHILS # BLD AUTO: 4 K/UL (ref 1.8–7.7)
NEUTROPHILS NFR BLD: 53.2 % (ref 38–73)
NITRITE UR QL STRIP: NEGATIVE
NRBC BLD-RTO: 0 /100 WBC
PH UR STRIP: 7 [PH] (ref 5–8)
PLATELET # BLD AUTO: 209 K/UL (ref 150–450)
PMV BLD AUTO: 10.3 FL (ref 9.2–12.9)
POTASSIUM SERPL-SCNC: 3.7 MMOL/L (ref 3.5–5.1)
PROT SERPL-MCNC: 7.3 G/DL (ref 6–8.4)
PROT UR QL STRIP: NEGATIVE
RBC # BLD AUTO: 4.96 M/UL (ref 4–5.4)
SODIUM SERPL-SCNC: 144 MMOL/L (ref 136–145)
SP GR UR STRIP: 1.01 (ref 1–1.03)
URN SPEC COLLECT METH UR: ABNORMAL
UROBILINOGEN UR STRIP-ACNC: NEGATIVE EU/DL
WBC # BLD AUTO: 7.45 K/UL (ref 3.9–12.7)

## 2021-07-14 PROCEDURE — 80053 COMPREHEN METABOLIC PANEL: CPT | Performed by: EMERGENCY MEDICINE

## 2021-07-14 PROCEDURE — 63600175 PHARM REV CODE 636 W HCPCS: Performed by: EMERGENCY MEDICINE

## 2021-07-14 PROCEDURE — 96361 HYDRATE IV INFUSION ADD-ON: CPT

## 2021-07-14 PROCEDURE — 74176 CT RENAL STONE STUDY ABD PELVIS WO: ICD-10-PCS | Mod: 26,,, | Performed by: RADIOLOGY

## 2021-07-14 PROCEDURE — 25000003 PHARM REV CODE 250: Performed by: EMERGENCY MEDICINE

## 2021-07-14 PROCEDURE — 81003 URINALYSIS AUTO W/O SCOPE: CPT | Performed by: EMERGENCY MEDICINE

## 2021-07-14 PROCEDURE — 74176 CT ABD & PELVIS W/O CONTRAST: CPT | Mod: TC

## 2021-07-14 PROCEDURE — 85025 COMPLETE CBC W/AUTO DIFF WBC: CPT | Performed by: EMERGENCY MEDICINE

## 2021-07-14 PROCEDURE — 99284 EMERGENCY DEPT VISIT MOD MDM: CPT | Mod: 25

## 2021-07-14 PROCEDURE — 96374 THER/PROPH/DIAG INJ IV PUSH: CPT

## 2021-07-14 PROCEDURE — 74176 CT ABD & PELVIS W/O CONTRAST: CPT | Mod: 26,,, | Performed by: RADIOLOGY

## 2021-07-14 RX ORDER — ONDANSETRON 2 MG/ML
4 INJECTION INTRAMUSCULAR; INTRAVENOUS
Status: DISCONTINUED | OUTPATIENT
Start: 2021-07-14 | End: 2021-07-14 | Stop reason: HOSPADM

## 2021-07-14 RX ORDER — HYDROCODONE BITARTRATE AND ACETAMINOPHEN 10; 325 MG/1; MG/1
1 TABLET ORAL
COMMUNITY
End: 2022-04-18

## 2021-07-14 RX ORDER — KETOROLAC TROMETHAMINE 10 MG/1
10 TABLET, FILM COATED ORAL EVERY 6 HOURS
Qty: 15 TABLET | Refills: 0 | Status: SHIPPED | OUTPATIENT
Start: 2021-07-14 | End: 2021-07-19

## 2021-07-14 RX ORDER — KETOROLAC TROMETHAMINE 30 MG/ML
15 INJECTION, SOLUTION INTRAMUSCULAR; INTRAVENOUS
Status: COMPLETED | OUTPATIENT
Start: 2021-07-14 | End: 2021-07-14

## 2021-07-14 RX ORDER — ONDANSETRON 4 MG/1
4 TABLET, FILM COATED ORAL EVERY 6 HOURS PRN
Qty: 15 TABLET | Refills: 0 | Status: SHIPPED | OUTPATIENT
Start: 2021-07-14 | End: 2021-07-19

## 2021-07-14 RX ADMIN — KETOROLAC TROMETHAMINE 15 MG: 30 INJECTION, SOLUTION INTRAMUSCULAR at 08:07

## 2021-07-14 RX ADMIN — SODIUM CHLORIDE 1000 ML: 0.9 INJECTION, SOLUTION INTRAVENOUS at 08:07

## 2021-07-14 RX ADMIN — KETOROLAC TROMETHAMINE 15 MG: 30 INJECTION, SOLUTION INTRAMUSCULAR at 07:07

## 2021-07-15 ENCOUNTER — HOSPITAL ENCOUNTER (OUTPATIENT)
Dept: RADIOLOGY | Facility: HOSPITAL | Age: 66
Discharge: HOME | End: 2021-07-15
Attending: NURSE PRACTITIONER
Payer: COMMERCIAL

## 2021-07-15 DIAGNOSIS — R10.11 RIGHT UPPER QUADRANT ABDOMINAL PAIN: ICD-10-CM

## 2021-07-15 PROCEDURE — A9698 NON-RAD CONTRAST MATERIALNOC: HCPCS | Performed by: NURSE PRACTITIONER

## 2021-07-15 PROCEDURE — 25500020 PHARM REV CODE 255: Performed by: NURSE PRACTITIONER

## 2021-07-15 RX ADMIN — IOHEXOL 1000 ML: 9 SOLUTION ORAL at 03:07

## 2021-07-15 RX ADMIN — IOHEXOL 75 ML: 350 INJECTION, SOLUTION INTRAVENOUS at 04:07

## 2021-07-28 ENCOUNTER — HOSPITAL ENCOUNTER (OUTPATIENT)
Dept: RADIOLOGY | Facility: HOSPITAL | Age: 66
Discharge: HOME | End: 2021-07-28
Attending: NURSE PRACTITIONER
Payer: COMMERCIAL

## 2021-07-28 DIAGNOSIS — K76.9 LIVER DISEASE, UNSPECIFIED: ICD-10-CM

## 2021-07-28 PROCEDURE — 74183 MRI ABD W/O CNTR FLWD CNTR: CPT | Mod: 26,,, | Performed by: RADIOLOGY

## 2021-07-28 PROCEDURE — 74183 MRI ABDOMEN W WO CONTRAST: ICD-10-PCS | Mod: 26,,, | Performed by: RADIOLOGY

## 2021-07-28 PROCEDURE — 25500020 PHARM REV CODE 255: Performed by: NURSE PRACTITIONER

## 2021-07-28 PROCEDURE — A9585 GADOBUTROL INJECTION: HCPCS | Performed by: NURSE PRACTITIONER

## 2021-07-28 PROCEDURE — 74183 MRI ABD W/O CNTR FLWD CNTR: CPT | Mod: TC

## 2021-07-28 RX ORDER — GADOBUTROL 604.72 MG/ML
6 INJECTION INTRAVENOUS
Status: CANCELLED | OUTPATIENT
Start: 2021-07-28

## 2021-07-28 RX ORDER — GADOBUTROL 604.72 MG/ML
6 INJECTION INTRAVENOUS
Status: COMPLETED | OUTPATIENT
Start: 2021-07-28 | End: 2021-07-28

## 2021-07-28 RX ADMIN — GADOBUTROL 6 ML: 604.72 INJECTION INTRAVENOUS at 08:07

## 2021-07-30 ENCOUNTER — HOSPITAL ENCOUNTER (OUTPATIENT)
Dept: RADIOLOGY | Facility: HOSPITAL | Age: 66
Discharge: HOME | End: 2021-07-30
Attending: NURSE PRACTITIONER
Payer: COMMERCIAL

## 2021-07-30 VITALS — HEIGHT: 62 IN | BODY MASS INDEX: 23.13 KG/M2 | WEIGHT: 125.69 LBS

## 2021-07-30 DIAGNOSIS — Z12.31 ENCOUNTER FOR SCREENING MAMMOGRAM FOR BREAST CANCER: ICD-10-CM

## 2021-07-30 PROCEDURE — 77067 MAMMO DIGITAL SCREENING BILAT WITH TOMO: ICD-10-PCS | Mod: 26,,, | Performed by: RADIOLOGY

## 2021-07-30 PROCEDURE — 77067 SCR MAMMO BI INCL CAD: CPT | Mod: TC

## 2021-07-30 PROCEDURE — 77063 MAMMO DIGITAL SCREENING BILAT WITH TOMO: ICD-10-PCS | Mod: 26,,, | Performed by: RADIOLOGY

## 2021-07-30 PROCEDURE — 77063 BREAST TOMOSYNTHESIS BI: CPT | Mod: 26,,, | Performed by: RADIOLOGY

## 2021-07-30 PROCEDURE — 77067 SCR MAMMO BI INCL CAD: CPT | Mod: 26,,, | Performed by: RADIOLOGY

## 2021-08-02 ENCOUNTER — TELEPHONE (OUTPATIENT)
Dept: HEPATOLOGY | Facility: CLINIC | Age: 66
End: 2021-08-02

## 2021-08-18 ENCOUNTER — OFFICE VISIT (OUTPATIENT)
Dept: HEPATOLOGY | Facility: CLINIC | Age: 66
End: 2021-08-18
Payer: MEDICARE

## 2021-08-18 ENCOUNTER — LAB VISIT (OUTPATIENT)
Dept: LAB | Facility: HOSPITAL | Age: 66
End: 2021-08-18
Attending: INTERNAL MEDICINE
Payer: MEDICARE

## 2021-08-18 VITALS
OXYGEN SATURATION: 99 % | HEART RATE: 72 BPM | BODY MASS INDEX: 23.83 KG/M2 | DIASTOLIC BLOOD PRESSURE: 60 MMHG | HEIGHT: 62 IN | TEMPERATURE: 98 F | SYSTOLIC BLOOD PRESSURE: 120 MMHG | RESPIRATION RATE: 17 BRPM | WEIGHT: 129.5 LBS

## 2021-08-18 DIAGNOSIS — K76.9 LIVER LESION: Primary | ICD-10-CM

## 2021-08-18 DIAGNOSIS — K76.0 FATTY LIVER: ICD-10-CM

## 2021-08-18 DIAGNOSIS — K74.60 HEPATIC CIRRHOSIS, UNSPECIFIED HEPATIC CIRRHOSIS TYPE, UNSPECIFIED WHETHER ASCITES PRESENT: ICD-10-CM

## 2021-08-18 LAB
A1AT SERPL-MCNC: 153 MG/DL (ref 100–190)
AFP SERPL-MCNC: <2 NG/ML (ref 0–8.4)
ALBUMIN SERPL BCP-MCNC: 4.2 G/DL (ref 3.5–5.2)
ALP SERPL-CCNC: 48 U/L (ref 55–135)
ALT SERPL W/O P-5'-P-CCNC: 32 U/L (ref 10–44)
ANION GAP SERPL CALC-SCNC: 11 MMOL/L (ref 8–16)
AST SERPL-CCNC: 33 U/L (ref 10–40)
BASOPHILS # BLD AUTO: 0.01 K/UL (ref 0–0.2)
BASOPHILS NFR BLD: 0.2 % (ref 0–1.9)
BILIRUB SERPL-MCNC: 0.3 MG/DL (ref 0.1–1)
BUN SERPL-MCNC: 9 MG/DL (ref 8–23)
CALCIUM SERPL-MCNC: 9.6 MG/DL (ref 8.7–10.5)
CHLORIDE SERPL-SCNC: 107 MMOL/L (ref 95–110)
CO2 SERPL-SCNC: 26 MMOL/L (ref 23–29)
CREAT SERPL-MCNC: 0.8 MG/DL (ref 0.5–1.4)
DIFFERENTIAL METHOD: NORMAL
EOSINOPHIL # BLD AUTO: 0.1 K/UL (ref 0–0.5)
EOSINOPHIL NFR BLD: 0.9 % (ref 0–8)
ERYTHROCYTE [DISTWIDTH] IN BLOOD BY AUTOMATED COUNT: 12.9 % (ref 11.5–14.5)
EST. GFR  (AFRICAN AMERICAN): >60 ML/MIN/1.73 M^2
EST. GFR  (NON AFRICAN AMERICAN): >60 ML/MIN/1.73 M^2
FERRITIN SERPL-MCNC: 45 NG/ML (ref 20–300)
GLUCOSE SERPL-MCNC: 71 MG/DL (ref 70–110)
HCT VFR BLD AUTO: 42.5 % (ref 37–48.5)
HGB BLD-MCNC: 14.2 G/DL (ref 12–16)
IGG SERPL-MCNC: 892 MG/DL (ref 650–1600)
IMM GRANULOCYTES # BLD AUTO: 0.02 K/UL (ref 0–0.04)
IMM GRANULOCYTES NFR BLD AUTO: 0.3 % (ref 0–0.5)
INR PPP: 1 (ref 0.8–1.2)
IRON SERPL-MCNC: 59 UG/DL (ref 30–160)
LYMPHOCYTES # BLD AUTO: 1.4 K/UL (ref 1–4.8)
LYMPHOCYTES NFR BLD: 23.5 % (ref 18–48)
MCH RBC QN AUTO: 30.5 PG (ref 27–31)
MCHC RBC AUTO-ENTMCNC: 33.4 G/DL (ref 32–36)
MCV RBC AUTO: 91 FL (ref 82–98)
MONOCYTES # BLD AUTO: 0.4 K/UL (ref 0.3–1)
MONOCYTES NFR BLD: 7.2 % (ref 4–15)
NEUTROPHILS # BLD AUTO: 4 K/UL (ref 1.8–7.7)
NEUTROPHILS NFR BLD: 67.9 % (ref 38–73)
NRBC BLD-RTO: 0 /100 WBC
PLATELET # BLD AUTO: 170 K/UL (ref 150–450)
PMV BLD AUTO: 10.2 FL (ref 9.2–12.9)
POTASSIUM SERPL-SCNC: 3.3 MMOL/L (ref 3.5–5.1)
PROT SERPL-MCNC: 7.4 G/DL (ref 6–8.4)
PROTHROMBIN TIME: 10.7 SEC (ref 9–12.5)
RBC # BLD AUTO: 4.65 M/UL (ref 4–5.4)
SATURATED IRON: 16 % (ref 20–50)
SODIUM SERPL-SCNC: 144 MMOL/L (ref 136–145)
TOTAL IRON BINDING CAPACITY: 379 UG/DL (ref 250–450)
TRANSFERRIN SERPL-MCNC: 256 MG/DL (ref 200–375)
WBC # BLD AUTO: 5.87 K/UL (ref 3.9–12.7)

## 2021-08-18 PROCEDURE — 87389 HIV-1 AG W/HIV-1&-2 AB AG IA: CPT | Performed by: INTERNAL MEDICINE

## 2021-08-18 PROCEDURE — 99214 OFFICE O/P EST MOD 30 MIN: CPT | Mod: S$PBB,,, | Performed by: INTERNAL MEDICINE

## 2021-08-18 PROCEDURE — 80053 COMPREHEN METABOLIC PANEL: CPT | Performed by: INTERNAL MEDICINE

## 2021-08-18 PROCEDURE — 80321 ALCOHOLS BIOMARKERS 1OR 2: CPT | Performed by: INTERNAL MEDICINE

## 2021-08-18 PROCEDURE — 86256 FLUORESCENT ANTIBODY TITER: CPT | Mod: 91 | Performed by: INTERNAL MEDICINE

## 2021-08-18 PROCEDURE — 85025 COMPLETE CBC W/AUTO DIFF WBC: CPT | Performed by: INTERNAL MEDICINE

## 2021-08-18 PROCEDURE — 85610 PROTHROMBIN TIME: CPT | Performed by: INTERNAL MEDICINE

## 2021-08-18 PROCEDURE — 86038 ANTINUCLEAR ANTIBODIES: CPT | Performed by: INTERNAL MEDICINE

## 2021-08-18 PROCEDURE — 82728 ASSAY OF FERRITIN: CPT | Performed by: INTERNAL MEDICINE

## 2021-08-18 PROCEDURE — 87340 HEPATITIS B SURFACE AG IA: CPT | Performed by: INTERNAL MEDICINE

## 2021-08-18 PROCEDURE — 84466 ASSAY OF TRANSFERRIN: CPT | Performed by: INTERNAL MEDICINE

## 2021-08-18 PROCEDURE — 86704 HEP B CORE ANTIBODY TOTAL: CPT | Performed by: INTERNAL MEDICINE

## 2021-08-18 PROCEDURE — 86235 NUCLEAR ANTIGEN ANTIBODY: CPT | Performed by: INTERNAL MEDICINE

## 2021-08-18 PROCEDURE — 86706 HEP B SURFACE ANTIBODY: CPT | Performed by: INTERNAL MEDICINE

## 2021-08-18 PROCEDURE — 86803 HEPATITIS C AB TEST: CPT | Performed by: INTERNAL MEDICINE

## 2021-08-18 PROCEDURE — 99215 OFFICE O/P EST HI 40 MIN: CPT | Mod: PBBFAC,PN | Performed by: INTERNAL MEDICINE

## 2021-08-18 PROCEDURE — 99999 PR PBB SHADOW E&M-EST. PATIENT-LVL V: ICD-10-PCS | Mod: PBBFAC,,, | Performed by: INTERNAL MEDICINE

## 2021-08-18 PROCEDURE — 99214 PR OFFICE/OUTPT VISIT, EST, LEVL IV, 30-39 MIN: ICD-10-PCS | Mod: S$PBB,,, | Performed by: INTERNAL MEDICINE

## 2021-08-18 PROCEDURE — 82784 ASSAY IGA/IGD/IGG/IGM EACH: CPT | Performed by: INTERNAL MEDICINE

## 2021-08-18 PROCEDURE — 82105 ALPHA-FETOPROTEIN SERUM: CPT | Performed by: INTERNAL MEDICINE

## 2021-08-18 PROCEDURE — 99999 PR PBB SHADOW E&M-EST. PATIENT-LVL V: CPT | Mod: PBBFAC,,, | Performed by: INTERNAL MEDICINE

## 2021-08-18 PROCEDURE — 86790 VIRUS ANTIBODY NOS: CPT | Performed by: INTERNAL MEDICINE

## 2021-08-19 LAB
ANA SER QL IF: NORMAL
HIV 1+2 AB+HIV1 P24 AG SERPL QL IA: NEGATIVE
MITOCHONDRIA AB TITR SER IF: NORMAL {TITER}
SMOOTH MUSCLE AB TITR SER IF: NORMAL {TITER}

## 2021-08-20 LAB
HBV CORE AB SERPL QL IA: NEGATIVE
HBV SURFACE AB SER-ACNC: NEGATIVE M[IU]/ML
HBV SURFACE AG SERPL QL IA: NEGATIVE
HCV AB SERPL QL IA: NEGATIVE
HEPATITIS A ANTIBODY, IGG: NEGATIVE

## 2021-08-23 LAB — PHOSPHATIDYLETHANOL (PETH): NEGATIVE NG/ML

## 2021-08-24 LAB
A1AT PHENOTYP SERPL-IMP: NORMAL BANDS
A1AT SERPL NEPH-MCNC: 151 MG/DL (ref 100–190)

## 2021-09-08 ENCOUNTER — HOSPITAL ENCOUNTER (OUTPATIENT)
Dept: RADIOLOGY | Facility: HOSPITAL | Age: 66
Discharge: HOME | End: 2021-09-08
Attending: NURSE PRACTITIONER
Payer: MEDICARE

## 2021-09-08 DIAGNOSIS — E04.2 MULTIPLE THYROID NODULES: ICD-10-CM

## 2021-09-08 PROCEDURE — 76536 US SOFT TISSUE HEAD NECK THYROID: ICD-10-PCS | Mod: 26,,, | Performed by: RADIOLOGY

## 2021-09-08 PROCEDURE — 76536 US EXAM OF HEAD AND NECK: CPT | Mod: 26,,, | Performed by: RADIOLOGY

## 2021-09-08 PROCEDURE — 76536 US EXAM OF HEAD AND NECK: CPT | Mod: TC

## 2021-11-12 DIAGNOSIS — R93.2 ABNORMAL LIVER DIAGNOSTIC IMAGING: Primary | ICD-10-CM

## 2021-11-15 ENCOUNTER — HOSPITAL ENCOUNTER (OUTPATIENT)
Dept: RADIOLOGY | Facility: HOSPITAL | Age: 66
Discharge: HOME | End: 2021-11-15
Attending: INTERNAL MEDICINE
Payer: MEDICARE

## 2021-11-15 DIAGNOSIS — K74.60 HEPATIC CIRRHOSIS, UNSPECIFIED HEPATIC CIRRHOSIS TYPE, UNSPECIFIED WHETHER ASCITES PRESENT: ICD-10-CM

## 2021-11-15 DIAGNOSIS — K76.9 LIVER LESION: ICD-10-CM

## 2021-11-15 PROCEDURE — 74183 MRI ABD W/O CNTR FLWD CNTR: CPT | Mod: 26,,, | Performed by: RADIOLOGY

## 2021-11-15 PROCEDURE — 25500020 PHARM REV CODE 255: Performed by: INTERNAL MEDICINE

## 2021-11-15 PROCEDURE — 74183 MRI ABD W/O CNTR FLWD CNTR: CPT | Mod: TC

## 2021-11-15 PROCEDURE — 74183 MRI ABDOMEN W WO CONTRAST: ICD-10-PCS | Mod: 26,,, | Performed by: RADIOLOGY

## 2021-11-15 PROCEDURE — A9585 GADOBUTROL INJECTION: HCPCS | Performed by: INTERNAL MEDICINE

## 2021-11-15 RX ADMIN — GADOBUTROL 6 ML: 604.72 INJECTION INTRAVENOUS at 09:11

## 2021-11-29 ENCOUNTER — OFFICE VISIT (OUTPATIENT)
Dept: HEPATOLOGY | Facility: CLINIC | Age: 66
End: 2021-11-29
Payer: MEDICARE

## 2021-11-29 VITALS
RESPIRATION RATE: 17 BRPM | HEIGHT: 62 IN | SYSTOLIC BLOOD PRESSURE: 118 MMHG | WEIGHT: 126.63 LBS | DIASTOLIC BLOOD PRESSURE: 63 MMHG | TEMPERATURE: 99 F | OXYGEN SATURATION: 100 % | HEART RATE: 79 BPM | BODY MASS INDEX: 23.3 KG/M2

## 2021-11-29 DIAGNOSIS — K76.0 FATTY LIVER: ICD-10-CM

## 2021-11-29 DIAGNOSIS — K74.60 HEPATIC CIRRHOSIS, UNSPECIFIED HEPATIC CIRRHOSIS TYPE, UNSPECIFIED WHETHER ASCITES PRESENT: Primary | ICD-10-CM

## 2021-11-29 DIAGNOSIS — K76.9 LIVER LESION: ICD-10-CM

## 2021-11-29 DIAGNOSIS — E61.1 IRON DEFICIENCY: ICD-10-CM

## 2021-11-29 PROCEDURE — 99999 PR PBB SHADOW E&M-EST. PATIENT-LVL IV: ICD-10-PCS | Mod: PBBFAC,,, | Performed by: INTERNAL MEDICINE

## 2021-11-29 PROCEDURE — 99214 OFFICE O/P EST MOD 30 MIN: CPT | Mod: PBBFAC,PN | Performed by: INTERNAL MEDICINE

## 2021-11-29 PROCEDURE — 99999 PR PBB SHADOW E&M-EST. PATIENT-LVL IV: CPT | Mod: PBBFAC,,, | Performed by: INTERNAL MEDICINE

## 2021-11-29 PROCEDURE — 99214 OFFICE O/P EST MOD 30 MIN: CPT | Mod: S$PBB,,, | Performed by: INTERNAL MEDICINE

## 2021-11-29 PROCEDURE — 99214 PR OFFICE/OUTPT VISIT, EST, LEVL IV, 30-39 MIN: ICD-10-PCS | Mod: S$PBB,,, | Performed by: INTERNAL MEDICINE

## 2022-01-18 ENCOUNTER — LAB VISIT (OUTPATIENT)
Dept: LAB | Facility: HOSPITAL | Age: 67
End: 2022-01-18
Attending: INTERNAL MEDICINE
Payer: MEDICARE

## 2022-01-18 DIAGNOSIS — K74.60 HEPATIC CIRRHOSIS, UNSPECIFIED HEPATIC CIRRHOSIS TYPE, UNSPECIFIED WHETHER ASCITES PRESENT: ICD-10-CM

## 2022-01-18 LAB
AFP SERPL-MCNC: <2 NG/ML (ref 0–8.4)
AFP SERPL-MCNC: <2 NG/ML (ref 0–8.4)
ALBUMIN SERPL BCP-MCNC: 4.1 G/DL (ref 3.5–5.2)
ALBUMIN SERPL BCP-MCNC: 4.1 G/DL (ref 3.5–5.2)
ALP SERPL-CCNC: 53 U/L (ref 55–135)
ALP SERPL-CCNC: 53 U/L (ref 55–135)
ALT SERPL W/O P-5'-P-CCNC: 32 U/L (ref 10–44)
ALT SERPL W/O P-5'-P-CCNC: 32 U/L (ref 10–44)
ANION GAP SERPL CALC-SCNC: 11 MMOL/L (ref 8–16)
ANION GAP SERPL CALC-SCNC: 11 MMOL/L (ref 8–16)
AST SERPL-CCNC: 32 U/L (ref 10–40)
AST SERPL-CCNC: 32 U/L (ref 10–40)
BASOPHILS # BLD AUTO: 0.02 K/UL (ref 0–0.2)
BASOPHILS # BLD AUTO: 0.02 K/UL (ref 0–0.2)
BASOPHILS NFR BLD: 0.3 % (ref 0–1.9)
BASOPHILS NFR BLD: 0.3 % (ref 0–1.9)
BILIRUB SERPL-MCNC: 0.3 MG/DL (ref 0.1–1)
BILIRUB SERPL-MCNC: 0.3 MG/DL (ref 0.1–1)
BUN SERPL-MCNC: 15 MG/DL (ref 8–23)
BUN SERPL-MCNC: 15 MG/DL (ref 8–23)
CALCIUM SERPL-MCNC: 9.7 MG/DL (ref 8.7–10.5)
CALCIUM SERPL-MCNC: 9.7 MG/DL (ref 8.7–10.5)
CHLORIDE SERPL-SCNC: 102 MMOL/L (ref 95–110)
CHLORIDE SERPL-SCNC: 102 MMOL/L (ref 95–110)
CO2 SERPL-SCNC: 25 MMOL/L (ref 23–29)
CO2 SERPL-SCNC: 25 MMOL/L (ref 23–29)
CREAT SERPL-MCNC: 0.8 MG/DL (ref 0.5–1.4)
CREAT SERPL-MCNC: 0.8 MG/DL (ref 0.5–1.4)
DIFFERENTIAL METHOD: NORMAL
DIFFERENTIAL METHOD: NORMAL
EOSINOPHIL # BLD AUTO: 0.1 K/UL (ref 0–0.5)
EOSINOPHIL # BLD AUTO: 0.1 K/UL (ref 0–0.5)
EOSINOPHIL NFR BLD: 1.5 % (ref 0–8)
EOSINOPHIL NFR BLD: 1.5 % (ref 0–8)
ERYTHROCYTE [DISTWIDTH] IN BLOOD BY AUTOMATED COUNT: 11.9 % (ref 11.5–14.5)
ERYTHROCYTE [DISTWIDTH] IN BLOOD BY AUTOMATED COUNT: 11.9 % (ref 11.5–14.5)
EST. GFR  (AFRICAN AMERICAN): >60 ML/MIN/1.73 M^2
EST. GFR  (AFRICAN AMERICAN): >60 ML/MIN/1.73 M^2
EST. GFR  (NON AFRICAN AMERICAN): >60 ML/MIN/1.73 M^2
EST. GFR  (NON AFRICAN AMERICAN): >60 ML/MIN/1.73 M^2
GLUCOSE SERPL-MCNC: 89 MG/DL (ref 70–110)
GLUCOSE SERPL-MCNC: 89 MG/DL (ref 70–110)
HCT VFR BLD AUTO: 40.8 % (ref 37–48.5)
HCT VFR BLD AUTO: 40.8 % (ref 37–48.5)
HGB BLD-MCNC: 13.7 G/DL (ref 12–16)
HGB BLD-MCNC: 13.7 G/DL (ref 12–16)
IMM GRANULOCYTES # BLD AUTO: 0.03 K/UL (ref 0–0.04)
IMM GRANULOCYTES # BLD AUTO: 0.03 K/UL (ref 0–0.04)
IMM GRANULOCYTES NFR BLD AUTO: 0.4 % (ref 0–0.5)
IMM GRANULOCYTES NFR BLD AUTO: 0.4 % (ref 0–0.5)
IRON SERPL-MCNC: 41 UG/DL (ref 30–160)
LYMPHOCYTES # BLD AUTO: 1.9 K/UL (ref 1–4.8)
LYMPHOCYTES # BLD AUTO: 1.9 K/UL (ref 1–4.8)
LYMPHOCYTES NFR BLD: 25.2 % (ref 18–48)
LYMPHOCYTES NFR BLD: 25.2 % (ref 18–48)
MCH RBC QN AUTO: 30.2 PG (ref 27–31)
MCH RBC QN AUTO: 30.2 PG (ref 27–31)
MCHC RBC AUTO-ENTMCNC: 33.6 G/DL (ref 32–36)
MCHC RBC AUTO-ENTMCNC: 33.6 G/DL (ref 32–36)
MCV RBC AUTO: 90 FL (ref 82–98)
MCV RBC AUTO: 90 FL (ref 82–98)
MONOCYTES # BLD AUTO: 0.6 K/UL (ref 0.3–1)
MONOCYTES # BLD AUTO: 0.6 K/UL (ref 0.3–1)
MONOCYTES NFR BLD: 8.5 % (ref 4–15)
MONOCYTES NFR BLD: 8.5 % (ref 4–15)
NEUTROPHILS # BLD AUTO: 4.8 K/UL (ref 1.8–7.7)
NEUTROPHILS # BLD AUTO: 4.8 K/UL (ref 1.8–7.7)
NEUTROPHILS NFR BLD: 64.1 % (ref 38–73)
NEUTROPHILS NFR BLD: 64.1 % (ref 38–73)
NRBC BLD-RTO: 0 /100 WBC
NRBC BLD-RTO: 0 /100 WBC
PLATELET # BLD AUTO: 162 K/UL (ref 150–450)
PLATELET # BLD AUTO: 162 K/UL (ref 150–450)
PMV BLD AUTO: 9.2 FL (ref 9.2–12.9)
PMV BLD AUTO: 9.2 FL (ref 9.2–12.9)
POTASSIUM SERPL-SCNC: 4.1 MMOL/L (ref 3.5–5.1)
POTASSIUM SERPL-SCNC: 4.1 MMOL/L (ref 3.5–5.1)
PROT SERPL-MCNC: 7.4 G/DL (ref 6–8.4)
PROT SERPL-MCNC: 7.4 G/DL (ref 6–8.4)
RBC # BLD AUTO: 4.54 M/UL (ref 4–5.4)
RBC # BLD AUTO: 4.54 M/UL (ref 4–5.4)
SATURATED IRON: 12 % (ref 20–50)
SODIUM SERPL-SCNC: 138 MMOL/L (ref 136–145)
SODIUM SERPL-SCNC: 138 MMOL/L (ref 136–145)
TOTAL IRON BINDING CAPACITY: 345 UG/DL (ref 250–450)
TRANSFERRIN SERPL-MCNC: 233 MG/DL (ref 200–375)
WBC # BLD AUTO: 7.41 K/UL (ref 3.9–12.7)
WBC # BLD AUTO: 7.41 K/UL (ref 3.9–12.7)

## 2022-01-18 PROCEDURE — 82105 ALPHA-FETOPROTEIN SERUM: CPT | Performed by: INTERNAL MEDICINE

## 2022-01-18 PROCEDURE — 36415 COLL VENOUS BLD VENIPUNCTURE: CPT | Performed by: INTERNAL MEDICINE

## 2022-01-18 PROCEDURE — 85610 PROTHROMBIN TIME: CPT | Performed by: INTERNAL MEDICINE

## 2022-01-18 PROCEDURE — 84466 ASSAY OF TRANSFERRIN: CPT | Performed by: INTERNAL MEDICINE

## 2022-01-18 PROCEDURE — 85025 COMPLETE CBC W/AUTO DIFF WBC: CPT | Performed by: INTERNAL MEDICINE

## 2022-01-18 PROCEDURE — 80053 COMPREHEN METABOLIC PANEL: CPT | Performed by: INTERNAL MEDICINE

## 2022-01-18 PROCEDURE — 82728 ASSAY OF FERRITIN: CPT | Performed by: INTERNAL MEDICINE

## 2022-01-18 PROCEDURE — 80321 ALCOHOLS BIOMARKERS 1OR 2: CPT | Performed by: INTERNAL MEDICINE

## 2022-01-19 LAB — FERRITIN SERPL-MCNC: 75 NG/ML (ref 20–300)

## 2022-01-21 LAB
PETH 16:0/18.1 (POPETH): <10 NG/ML
PETH 16:0/18.1 (POPETH): <10 NG/ML
PETH 16:0/18.2 (PLPETH): <10 NG/ML
PETH 16:0/18.2 (PLPETH): <10 NG/ML

## 2022-02-14 ENCOUNTER — HOSPITAL ENCOUNTER (OUTPATIENT)
Dept: RADIOLOGY | Facility: HOSPITAL | Age: 67
Discharge: HOME | End: 2022-02-14
Attending: INTERNAL MEDICINE
Payer: MEDICARE

## 2022-02-14 DIAGNOSIS — K76.9 LIVER LESION: Primary | ICD-10-CM

## 2022-02-14 DIAGNOSIS — K76.9 LIVER LESION: ICD-10-CM

## 2022-02-14 DIAGNOSIS — K74.60 HEPATIC CIRRHOSIS, UNSPECIFIED HEPATIC CIRRHOSIS TYPE, UNSPECIFIED WHETHER ASCITES PRESENT: ICD-10-CM

## 2022-02-14 PROCEDURE — A9585 GADOBUTROL INJECTION: HCPCS | Performed by: INTERNAL MEDICINE

## 2022-02-14 PROCEDURE — 74183 MRI ABD W/O CNTR FLWD CNTR: CPT | Mod: 26,,, | Performed by: RADIOLOGY

## 2022-02-14 PROCEDURE — 74183 MRI ABDOMEN W WO CONTRAST: ICD-10-PCS | Mod: 26,,, | Performed by: RADIOLOGY

## 2022-02-14 PROCEDURE — 25500020 PHARM REV CODE 255: Performed by: INTERNAL MEDICINE

## 2022-02-14 PROCEDURE — 74183 MRI ABD W/O CNTR FLWD CNTR: CPT | Mod: TC

## 2022-02-14 RX ORDER — GADOBUTROL 604.72 MG/ML
6 INJECTION INTRAVENOUS
Status: COMPLETED | OUTPATIENT
Start: 2022-02-14 | End: 2022-02-14

## 2022-02-14 RX ADMIN — GADOBUTROL 6 ML: 604.72 INJECTION INTRAVENOUS at 08:02

## 2022-02-28 ENCOUNTER — OFFICE VISIT (OUTPATIENT)
Dept: HEPATOLOGY | Facility: CLINIC | Age: 67
End: 2022-02-28
Payer: MEDICARE

## 2022-02-28 VITALS
WEIGHT: 125.25 LBS | OXYGEN SATURATION: 99 % | BODY MASS INDEX: 23.05 KG/M2 | TEMPERATURE: 97 F | DIASTOLIC BLOOD PRESSURE: 58 MMHG | HEART RATE: 65 BPM | RESPIRATION RATE: 17 BRPM | SYSTOLIC BLOOD PRESSURE: 113 MMHG | HEIGHT: 62 IN

## 2022-02-28 DIAGNOSIS — K74.60 HEPATIC CIRRHOSIS, UNSPECIFIED HEPATIC CIRRHOSIS TYPE, UNSPECIFIED WHETHER ASCITES PRESENT: Primary | ICD-10-CM

## 2022-02-28 DIAGNOSIS — K76.0 FATTY LIVER: ICD-10-CM

## 2022-02-28 DIAGNOSIS — K76.9 LIVER LESION: ICD-10-CM

## 2022-02-28 PROCEDURE — 99214 OFFICE O/P EST MOD 30 MIN: CPT | Mod: S$PBB,,, | Performed by: INTERNAL MEDICINE

## 2022-02-28 PROCEDURE — 99214 PR OFFICE/OUTPT VISIT, EST, LEVL IV, 30-39 MIN: ICD-10-PCS | Mod: S$PBB,,, | Performed by: INTERNAL MEDICINE

## 2022-02-28 PROCEDURE — 99214 OFFICE O/P EST MOD 30 MIN: CPT | Mod: PBBFAC,PN | Performed by: INTERNAL MEDICINE

## 2022-02-28 PROCEDURE — 99999 PR PBB SHADOW E&M-EST. PATIENT-LVL IV: ICD-10-PCS | Mod: PBBFAC,,, | Performed by: INTERNAL MEDICINE

## 2022-02-28 PROCEDURE — 99999 PR PBB SHADOW E&M-EST. PATIENT-LVL IV: CPT | Mod: PBBFAC,,, | Performed by: INTERNAL MEDICINE

## 2022-02-28 NOTE — PROGRESS NOTES
HEPATOLOGY FOLLOW UP    Referring Physician: Corinne Leonard NP  Current Corresponding Physician: Corinne Leonard NP, Gloria Navarrete III, MD    HPI  Liliam Woodson is here for follow up of cirrhosis. She is a 67 y.o. female with a hx of OA, HTN, thyroid disease, HLD, Sjogren's syndrome who had a ct scan done that suggested a fatty liver and cirrhosis:     Ct abdo/pelvis with contrast 7/15/21: Cirrhotic liver with mild fatty infiltration, changes of confluent fibrosis, hepatic cyst and hemangioma.  No suspicious liver lesion.  MRI abdo 7/28/21: The liver has a diffusely nodular contour.  There is mild fatty infiltration as demonstrated on the in and out of phase gradient echo images.  There are multiple linear bands of low T1 high T2 signal extending through the superior right lobe most consistent with confluent fibrosis.  There is retraction of the overlying hepatic capsule.  There is delayed enhancement postcontrast.  Along the margin of the lateral segment of the left lobe of the liver there is a a 12 mm oval circumscribed mass of high T2, low T1 signal.  This demonstrates peripheral enhancement on the earlier postcontrast images and becomes almost entirely high in signal on the delayed post-contrast images, series 1901, image 21.  This is likely a hemangioma.  Of note, a hemangioma was demonstrated in this location on ultrasound on 09/16/2019.  There is a nonenhancing 10 mm cyst in the right posterior lobe series 501, image 16.  No other focal liver lesions are present.  Specifically, no foci of arterial phase enhancement and washout are seen.     Labs 7/14/21: Tbil 0.4, ALT 32, AST 31, ALKP 46, creat 0.9, Na 144, plts 209  2013: HCV Ab-, HBsAg-  ADENIKE previously positive, now negative     The patient currently denies any symptoms of decompensated cirrhosis, including no ascites or edema, cognitive problems that would suggest hepatic encephalopathy, or GI bleeding from varices. Does have a history of heavy alcohol. She has  been sober for 30 years. But she was told had cirrhosis 30 years ago. She describes a distended abdomen (?ascites-but no paracentesis).      Interval History  Since Liliam Woodson's last 2 visits:    Serologic w/u for cirrhosis: HBsAg-, HBcAb-, HBsAb-, HAV IgG-, HCV Ab-, HIV-, ADENIKE-, ASMA-, AMA-, IgG normal, alpha 1 antitrypsin normal, iron sat 16%, ferritin 45    MRI abdo w wo contrast 11/15/21:  Is is there is a cyst seen within the left lobe of the liver unchanged from prior exam; no mention of liver solid lesion - message to reading radiologist to confirm absence  MRI abdo w wo contrast 2/14/22: The liver has a nodular contour in keeping with cirrhosis.  There is unchanged heterogeneous T2 hyperintense signal abnormality at the dome of the liver, stable since at least 07/28/2021.  A 1.2 cm simple cyst of a segment 5 is present.  Within the lateral segment of the left hepatic lobe, there is a 1.2 cm T2 hyperintense finding exhibiting some nodular peripheral enhancement favoring hemangioma and showing no significant change.  There is no new focal hepatic lesion.    EGD: to screen for EV ordered but not yet scheduled    Labs 1/31/22: Plts 180, Na 141, creat 0.69, Tbil 0.4, ALT 27, AST 28, INR ND  AFP 1/18/22: AFP    MELD-Na score: 6 at 8/18/2021 11:14 AM  MELD score: 6 at 8/18/2021 11:14 AM  Calculated from:  Serum Creatinine: 0.8 mg/dL (Using min of 1 mg/dL) at 8/18/2021 11:14 AM  Serum Sodium: 144 mmol/L (Using max of 137 mmol/L) at 8/18/2021 11:14 AM  Total Bilirubin: 0.3 mg/dL (Using min of 1 mg/dL) at 8/18/2021 11:14 AM  INR(ratio): 1.0 at 8/18/2021 11:14 AM  Age: 66 years    Outpatient Encounter Medications as of 2/28/2022   Medication Sig Dispense Refill    buPROPion (WELLBUTRIN XL) 300 MG 24 hr tablet Take 1 tablet (300 mg total) by mouth once daily. 90 tablet 3    cholecalciferol, vitamin D3, 1,000 unit capsule Take 2 capsules (2,000 Units total) by mouth once daily.  0    cyclobenzaprine (FLEXERIL) 5 MG  tablet       fluticasone-salmeterol diskus inhaler 250-50 mcg INHALE 1 SPRAY BY MOUTH 2 TIMES A DAY ( IN THE MORNING AND IN THE EVENING) FOR ASTHMA (Patient not taking: No sig reported) 60 each 11    gabapentin (NEURONTIN) 600 MG tablet       HYDROcodone-acetaminophen (NORCO)  mg per tablet Take 1 tablet by mouth.      iron bis-gly/FA/C/B12/Ca/succ (IRON-150 ORAL) Take by mouth.      naproxen (NAPROSYN) 375 MG tablet        No facility-administered encounter medications on file as of 2/28/2022.     Review of patient's allergies indicates:   Allergen Reactions    Lyrica [pregabalin] Nausea And Vomiting    Pcn [penicillins] Rash     Past Medical History:   Diagnosis Date    Allergy     Anterolisthesis     Asthma     Bulging lumbar disc     Cervical spondylosis     Cirrhosis 8/18/2021    Colon polyp, hyperplastic 07/26/2010    Compression fx, thoracic spine     DDD (degenerative disc disease), lumbar     Depression     Fatty liver 8/18/2021    Fibromyalgia     Gastritis 07/26/2010    Liver lesion 8/18/2021    Osteopenia 02/15/2018    Sjogren's syndrome     Thyroid disease     Thyroid nodule        Review of Systems   Constitutional: Negative.    HENT: Negative.    Eyes: Negative.    Respiratory: Negative.    Cardiovascular: Negative.    Gastrointestinal: Negative.    Genitourinary: Negative.    Musculoskeletal: Negative.    Skin: Negative.    Neurological: Negative.    Psychiatric/Behavioral: Negative.      Vitals:    02/28/22 0800   BP: (!) 113/58   Pulse: 65   Resp: 17   Temp: 97.4 °F (36.3 °C)       Physical Exam  Vitals reviewed.   Constitutional:       Appearance: She is well-developed.   HENT:      Head: Normocephalic and atraumatic.   Eyes:      General: No scleral icterus.     Conjunctiva/sclera: Conjunctivae normal.      Pupils: Pupils are equal, round, and reactive to light.   Neck:      Thyroid: No thyromegaly.   Cardiovascular:      Rate and Rhythm: Normal rate and regular  rhythm.      Heart sounds: Normal heart sounds.   Pulmonary:      Effort: Pulmonary effort is normal.      Breath sounds: Normal breath sounds. No rales.   Abdominal:      General: Bowel sounds are normal. There is no distension.      Palpations: Abdomen is soft. There is no mass.      Tenderness: There is no abdominal tenderness.   Musculoskeletal:         General: Normal range of motion.      Cervical back: Normal range of motion and neck supple.   Skin:     General: Skin is warm and dry.      Findings: No rash.   Neurological:      Mental Status: She is alert and oriented to person, place, and time.         MELD-Na score: 6 at 8/18/2021 11:14 AM  MELD score: 6 at 8/18/2021 11:14 AM  Calculated from:  Serum Creatinine: 0.8 mg/dL (Using min of 1 mg/dL) at 8/18/2021 11:14 AM  Serum Sodium: 144 mmol/L (Using max of 137 mmol/L) at 8/18/2021 11:14 AM  Total Bilirubin: 0.3 mg/dL (Using min of 1 mg/dL) at 8/18/2021 11:14 AM  INR(ratio): 1.0 at 8/18/2021 11:14 AM  Age: 66 years    Lab Results   Component Value Date    GLU 81 01/31/2022    BUN 14 01/31/2022    CREATININE 0.69 01/31/2022    CALCIUM 9.3 01/31/2022     01/31/2022    K 4.2 01/31/2022     01/31/2022    PROT 6.9 01/31/2022    CO2 29 01/31/2022    ANIONGAP 11 01/18/2022    ANIONGAP 11 01/18/2022    WBC 6.0 01/31/2022    RBC 4.54 01/31/2022    HGB 13.9 01/31/2022    HCT 40.5 01/31/2022    MCV 89.2 01/31/2022    MCH 30.6 01/31/2022    MCHC 34.3 01/31/2022     Lab Results   Component Value Date    RDW 11.7 01/31/2022     01/31/2022    MPV 9.4 01/31/2022    GRAN 4.8 01/18/2022    GRAN 64.1 01/18/2022    GRAN 4.8 01/18/2022    GRAN 64.1 01/18/2022    LYMPH 1,308 01/31/2022    LYMPH 21.8 01/31/2022    MONO 354 01/31/2022    MONO 5.9 01/31/2022    EOSINOPHIL 0.8 01/31/2022    BASOPHIL 0.3 01/31/2022    EOS 48 01/31/2022    BASO 18 01/31/2022    ADENIKE Positive (A) 09/18/2013    CHOL 193 01/31/2022    TRIG 113 01/31/2022    HDL 63 01/31/2022    CHOLHDL  3.1 01/31/2022    ALBUMIN 4.2 01/31/2022    AST 28 01/31/2022    ALT 27 01/31/2022    ALKPHOS 53 (L) 01/18/2022    ALKPHOS 53 (L) 01/18/2022    MG 2.0 01/09/2020    LABPROT 10.7 08/18/2021    INR 1.0 08/18/2021       Assessment and Plan:  Patient Active Problem List   Diagnosis    Thyroid disease    Fatigue    Depression    Joint pain    Dry mouth    Weight loss, non-intentional    Positive ADENIKE (antinuclear antibody)    Dry eyes    Fibromyalgia    Ear lesion    Asthma    Vitamin D deficiency    Primary Sjogren's syndrome    Compression fracture of thoracic vertebra    OA (osteoarthritis) of knee    Neck mass    History of partial thyroidectomy    Thyroid nodule    Osteoarthritis of cervical spine    Choking sensation    Hypercholesterolemia    Osteopenia    Cirrhosis    Fatty liver    Liver lesion     Liliam Woodson is a 67 y.o. female with a history of heavy alcohol in the past and was told she has cirrhosis. Current recommendations:  1. Cirrhosis, likely from previous alcohol; serologic w/u negative for other causes of cirrhosis. Monitor meld labs every 6 months; screen for HCC every 6 months with imaging; recommend repeat MRI as scheduled 8/15/22 to f/u on liver lesion, screen for varices with EGD; vaccinate for hepatitis A and B  2. Colorectal ca screening: by report had colonoscopy 6 years ago with no polyps; however now low ferritin; will repeat now; check iron studies with next blood tests 01/22     Return after next MRI 8/22

## 2022-03-31 ENCOUNTER — TELEPHONE (OUTPATIENT)
Dept: ENDOSCOPY | Facility: HOSPITAL | Age: 67
End: 2022-03-31
Payer: MEDICARE

## 2022-03-31 ENCOUNTER — TELEPHONE (OUTPATIENT)
Dept: HEPATOLOGY | Facility: CLINIC | Age: 67
End: 2022-03-31
Payer: MEDICARE

## 2022-03-31 NOTE — TELEPHONE ENCOUNTER
----- Message from Melanie Sahu sent at 3/31/2022 11:52 AM CDT -----  Regarding: Call back  Contact: [phone]  Type:  Patient Call Back    Who Called:pt  What this is regarding?:schedule colonoscopy and scope   Would the patient rather a call back or a response via [employer]? Call back  Best Call Back Number:[phone]  Additional Information:     Advised to call back directly if there are further questions, or if these symptoms fail to improve as anticipated or worsen.

## 2022-03-31 NOTE — TELEPHONE ENCOUNTER
Received [employer] message.    Called patient to schedule for an EGD and colonoscopy.  Pt states she wants to get her procedures done at [place] and will speak with her doctor to transfer the order to [place] location.

## 2022-04-18 PROBLEM — E04.2 MULTIPLE THYROID NODULES: Status: ACTIVE | Noted: 2022-04-18

## 2022-05-18 ENCOUNTER — TELEPHONE (OUTPATIENT)
Dept: HEPATOLOGY | Facility: CLINIC | Age: 67
End: 2022-05-18
Payer: MEDICARE

## 2022-08-02 ENCOUNTER — HOSPITAL ENCOUNTER (OUTPATIENT)
Dept: RADIOLOGY | Facility: HOSPITAL | Age: 67
Discharge: HOME | End: 2022-08-02
Attending: NURSE PRACTITIONER
Payer: MEDICARE

## 2022-08-02 VITALS — HEIGHT: 62 IN | WEIGHT: 125 LBS | BODY MASS INDEX: 23 KG/M2

## 2022-08-02 DIAGNOSIS — Z12.31 ENCOUNTER FOR SCREENING MAMMOGRAM FOR BREAST CANCER: ICD-10-CM

## 2022-08-02 PROCEDURE — 77063 BREAST TOMOSYNTHESIS BI: CPT | Mod: 26,,, | Performed by: RADIOLOGY

## 2022-08-02 PROCEDURE — 77063 BREAST TOMOSYNTHESIS BI: CPT | Mod: TC

## 2022-08-02 PROCEDURE — 77067 SCR MAMMO BI INCL CAD: CPT | Mod: TC

## 2022-08-02 PROCEDURE — 77067 SCR MAMMO BI INCL CAD: CPT | Mod: 26,,, | Performed by: RADIOLOGY

## 2022-08-02 PROCEDURE — 77067 MAMMO DIGITAL SCREENING BILAT WITH TOMO: ICD-10-PCS | Mod: 26,,, | Performed by: RADIOLOGY

## 2022-08-02 PROCEDURE — 77063 MAMMO DIGITAL SCREENING BILAT WITH TOMO: ICD-10-PCS | Mod: 26,,, | Performed by: RADIOLOGY

## 2022-08-15 ENCOUNTER — TELEPHONE (OUTPATIENT)
Dept: HEPATOLOGY | Facility: CLINIC | Age: 67
End: 2022-08-15
Payer: MEDICARE

## 2022-08-15 ENCOUNTER — HOSPITAL ENCOUNTER (OUTPATIENT)
Dept: RADIOLOGY | Facility: HOSPITAL | Age: 67
Discharge: HOME | End: 2022-08-15
Payer: MEDICARE

## 2022-08-15 PROCEDURE — 74150 CT ABDOMEN W/O CONTRAST: CPT | Mod: TC

## 2022-08-15 PROCEDURE — 72148 MRI LUMBAR SPINE W/O DYE: CPT | Mod: 26,,, | Performed by: RADIOLOGY

## 2022-08-15 PROCEDURE — 72148 MRI LUMBAR SPINE WITHOUT CONTRAST: ICD-10-PCS | Mod: 26,,, | Performed by: RADIOLOGY

## 2022-08-15 PROCEDURE — 74150 CT ABDOMEN W/O CONTRAST: CPT | Mod: 26,,, | Performed by: RADIOLOGY

## 2022-08-15 PROCEDURE — 72148 MRI LUMBAR SPINE W/O DYE: CPT | Mod: TC

## 2022-08-15 PROCEDURE — 74150 CT ABDOMEN WITHOUT CONTRAST: ICD-10-PCS | Mod: 26,,, | Performed by: RADIOLOGY

## 2022-08-15 NOTE — TELEPHONE ENCOUNTER
Hilary Aj sent to [facility] Staff  Caller: Unspecified (Today,  3:03 PM)  Name Of Caller: Liliam     Provider Name: Katya Mckee,     Does patient feel the need to be seen today? No     Relationship to the Pt?: pt     Contact Preference?: [phone]     What is the nature of the call?:Pt called and would like to speak to office because she went to do MRI but was told she could not do it because to the dye you want the patient to have for the MRI They don't have it please call her back regarding that       Left a voice message returning call

## 2022-08-15 NOTE — TELEPHONE ENCOUNTER
----- Message from Hilary Aj sent at 8/15/2022  3:03 PM CDT -----  Name Of Caller: Liliam     Provider Name: Katya Mckee,    Does patient feel the need to be seen today? No     Relationship to the Pt?: pt     Contact Preference?: [phone]    What is the nature of the call?:Pt called and would like to speak to office because she went to do MRI but was told she could not do it because to the dye you want the patient to have for the MRI They don't have it please call her back regarding that

## 2022-08-18 ENCOUNTER — OFFICE VISIT (OUTPATIENT)
Dept: HEPATOLOGY | Facility: CLINIC | Age: 67
End: 2022-08-18
Payer: MEDICARE

## 2022-08-18 ENCOUNTER — TELEPHONE (OUTPATIENT)
Dept: HEPATOLOGY | Facility: CLINIC | Age: 67
End: 2022-08-18
Payer: MEDICARE

## 2022-08-18 VITALS
WEIGHT: 133.94 LBS | DIASTOLIC BLOOD PRESSURE: 58 MMHG | HEIGHT: 62 IN | OXYGEN SATURATION: 100 % | BODY MASS INDEX: 24.65 KG/M2 | TEMPERATURE: 98 F | HEART RATE: 78 BPM | SYSTOLIC BLOOD PRESSURE: 119 MMHG | RESPIRATION RATE: 18 BRPM

## 2022-08-18 DIAGNOSIS — R79.0 LOW FERRITIN: ICD-10-CM

## 2022-08-18 DIAGNOSIS — K76.9 LIVER LESION: ICD-10-CM

## 2022-08-18 DIAGNOSIS — K76.0 FATTY LIVER: ICD-10-CM

## 2022-08-18 DIAGNOSIS — K74.69 OTHER CIRRHOSIS OF LIVER: Primary | ICD-10-CM

## 2022-08-18 PROCEDURE — 99214 PR OFFICE/OUTPT VISIT, EST, LEVL IV, 30-39 MIN: ICD-10-PCS | Mod: S$PBB,,, | Performed by: INTERNAL MEDICINE

## 2022-08-18 PROCEDURE — 99999 PR PBB SHADOW E&M-EST. PATIENT-LVL V: CPT | Mod: PBBFAC,,, | Performed by: INTERNAL MEDICINE

## 2022-08-18 PROCEDURE — 99999 PR PBB SHADOW E&M-EST. PATIENT-LVL V: ICD-10-PCS | Mod: PBBFAC,,, | Performed by: INTERNAL MEDICINE

## 2022-08-18 PROCEDURE — 99214 OFFICE O/P EST MOD 30 MIN: CPT | Mod: S$PBB,,, | Performed by: INTERNAL MEDICINE

## 2022-08-18 PROCEDURE — 99215 OFFICE O/P EST HI 40 MIN: CPT | Mod: PBBFAC,PN | Performed by: INTERNAL MEDICINE

## 2022-08-18 RX ORDER — FAMOTIDINE 40 MG/1
40 TABLET, FILM COATED ORAL NIGHTLY
COMMUNITY
Start: 2022-07-07 | End: 2023-04-27

## 2022-08-18 NOTE — PROGRESS NOTES
HEPATOLOGY FOLLOW UP    Referring Physician: Corinne Leonard NP  Current Corresponding Physician: Corinne Leonard NP, Gloria Navarrete III, MD    HPI  Liliam Woodson is here for follow up of cirrhosis. She is a 67 y.o. female with a hx of OA, HTN, thyroid disease, HLD, Sjogren's syndrome who had a ct scan done that suggested a fatty liver and cirrhosis. I saw her in consultation 8/18/21:     Ct abdo/pelvis with contrast 7/15/21: Cirrhotic liver with mild fatty infiltration, changes of confluent fibrosis, hepatic cyst and hemangioma.  No suspicious liver lesion.  MRI abdo 7/28/21: The liver has a diffusely nodular contour.  There is mild fatty infiltration as demonstrated on the in and out of phase gradient echo images.  There are multiple linear bands of low T1 high T2 signal extending through the superior right lobe most consistent with confluent fibrosis.  There is retraction of the overlying hepatic capsule.  There is delayed enhancement postcontrast.  Along the margin of the lateral segment of the left lobe of the liver there is a a 12 mm oval circumscribed mass of high T2, low T1 signal.  This demonstrates peripheral enhancement on the earlier postcontrast images and becomes almost entirely high in signal on the delayed post-contrast images, series 1901, image 21.  This is likely a hemangioma.  Of note, a hemangioma was demonstrated in this location on ultrasound on 09/16/2019.  There is a nonenhancing 10 mm cyst in the right posterior lobe series 501, image 16.  No other focal liver lesions are present.  Specifically, no foci of arterial phase enhancement and washout are seen.     Labs 7/14/21: Tbil 0.4, ALT 32, AST 31, ALKP 46, creat 0.9, Na 144, plts 209  2013: HCV Ab-, HBsAg-  ADENIKE previously positive, now negative    Serologic w/u for cirrhosis: HBsAg-, HBcAb-, HBsAb-, HAV IgG-, HCV Ab-, HIV-, ADENIKE-, ASMA-, AMA-, IgG normal, alpha 1 antitrypsin normal, iron sat 16%, ferritin 45     The patient denied any symptoms of  decompensated cirrhosis, including no ascites or edema, cognitive problems that would suggest hepatic encephalopathy, or GI bleeding from varices. Does have a history of heavy alcohol. She has been sober for 30 years. But she was told had cirrhosis 30 years ago. She describes a distended abdomen (?ascites-but no paracentesis).    Interval History  Since Liliam Woodson's last few visits:    MRI abdo w wo contrast 11/15/21:  Is is there is a cyst seen within the left lobe of the liver unchanged from prior exam; no mention of liver solid lesion - message to reading radiologist to confirm absence  MRI abdo w wo contrast 2/14/22: The liver has a nodular contour in keeping with cirrhosis.  There is unchanged heterogeneous T2 hyperintense signal abnormality at the dome of the liver, stable since at least 07/28/2021.  A 1.2 cm simple cyst of a segment 5 is present.  Within the lateral segment of the left hepatic lobe, there is a 1.2 cm T2 hyperintense finding exhibiting some nodular peripheral enhancement favoring hemangioma and showing no significant change.  There is no new focal hepatic lesion.  MRI abdo w wo EOVIST 8/15/22: ND since no eovist at the facility she went to    EGD: to screen for EV 7/7/22 at [facility]; no EV by report  Colonoscopy 7/7/22: at [facility]- small polyp by report    Labs 8/15/22: Plts 166, Na 140, creat 0.8, Tbil 0.3, ALT 26, AST 26, INR 1.0  AFP 1/18/22: <2  Ferritin 12 1/18/22 but not anemic 8/15/22 (hemoglobin 14.1)    MELD-Na score: 6 at 8/15/2022  8:46 AM  MELD score: 6 at 8/15/2022  8:46 AM  Calculated from:  Serum Creatinine: 0.8 mg/dL (Using min of 1 mg/dL) at 8/15/2022  8:46 AM  Serum Sodium: 140 mmol/L (Using max of 137 mmol/L) at 8/15/2022  8:46 AM  Total Bilirubin: 0.3 mg/dL (Using min of 1 mg/dL) at 8/15/2022  8:46 AM  INR(ratio): 1.0 at 8/15/2022  8:46 AM  Age: 67 years    Outpatient Encounter Medications as of 8/18/2022   Medication Sig Dispense Refill    buPROPion  (WELLBUTRIN XL) 150 MG TB24 tablet Take 1 tablet (150 mg total) by mouth once daily. Take with Wellbutrin XL 300mg 30 tablet 2    cholecalciferol, vitamin D3, 1,000 unit capsule Take 2 capsules (2,000 Units total) by mouth once daily.  0    cyclobenzaprine (FLEXERIL) 5 MG tablet       fluticasone-salmeterol diskus inhaler 250-50 mcg INHALE 1 SPRAY BY MOUTH 2 TIMES A DAY ( IN THE MORNING AND IN THE EVENING) FOR ASTHMA (Patient not taking: Reported on 7/18/2022) 60 each 11    gabapentin (NEURONTIN) 600 MG tablet Take 600 mg by mouth daily as needed.      naproxen (NAPROSYN) 375 MG tablet        No facility-administered encounter medications on file as of 8/18/2022.     Review of patient's allergies indicates:   Allergen Reactions    Lyrica [pregabalin] Nausea And Vomiting    Pcn [penicillins] Rash     Past Medical History:   Diagnosis Date    Allergy     Anterolisthesis     Asthma     Bulging lumbar disc     Cervical spondylosis     Cirrhosis 8/18/2021    Colon polyp, hyperplastic 07/26/2010    Compression fx, thoracic spine     DDD (degenerative disc disease), lumbar     Depression     Fatty liver 8/18/2021    Fibromyalgia     Gastritis 07/26/2010    Hepatic cirrhosis 8/18/2021    Liver lesion 8/18/2021    Multiple thyroid nodules 4/18/2022    Osteopenia 02/15/2018    Sjogren's syndrome     Thyroid disease     Thyroid nodule        Review of Systems   Constitutional: Negative.    HENT: Negative.    Eyes: Negative.    Respiratory: Negative.    Cardiovascular: Negative.    Gastrointestinal: Negative.    Genitourinary: Negative.    Musculoskeletal: Negative.    Skin: Negative.    Neurological: Negative.    Psychiatric/Behavioral: Negative.      Vitals:    08/18/22 0954   BP: (!) 119/58   Pulse: 78   Resp: 18   Temp: 97.7 °F (36.5 °C)     Physical Exam  Vitals reviewed.   Constitutional:       Appearance: She is well-developed.   HENT:      Head: Normocephalic and atraumatic.   Eyes:       General: No scleral icterus.     Conjunctiva/sclera: Conjunctivae normal.      Pupils: Pupils are equal, round, and reactive to light.   Neck:      Thyroid: No thyromegaly.   Cardiovascular:      Rate and Rhythm: Normal rate and regular rhythm.      Heart sounds: Normal heart sounds.   Pulmonary:      Effort: Pulmonary effort is normal.      Breath sounds: Normal breath sounds. No rales.   Abdominal:      General: Bowel sounds are normal. There is no distension.      Palpations: Abdomen is soft. There is no mass.      Tenderness: There is no abdominal tenderness.   Musculoskeletal:         General: Normal range of motion.      Cervical back: Normal range of motion and neck supple.   Skin:     General: Skin is warm and dry.      Findings: No rash.   Neurological:      Mental Status: She is alert and oriented to person, place, and time.         MELD-Na score: 6 at 8/15/2022  8:46 AM  MELD score: 6 at 8/15/2022  8:46 AM  Calculated from:  Serum Creatinine: 0.8 mg/dL (Using min of 1 mg/dL) at 8/15/2022  8:46 AM  Serum Sodium: 140 mmol/L (Using max of 137 mmol/L) at 8/15/2022  8:46 AM  Total Bilirubin: 0.3 mg/dL (Using min of 1 mg/dL) at 8/15/2022  8:46 AM  INR(ratio): 1.0 at 8/15/2022  8:46 AM  Age: 67 years    Lab Results   Component Value Date    GLU 90 08/15/2022    BUN 14 08/15/2022    CREATININE 0.8 08/15/2022    CALCIUM 9.1 08/15/2022     08/15/2022    K 4.2 08/15/2022     08/15/2022    PROT 7.0 08/15/2022    CO2 26 08/15/2022    ANIONGAP 10 08/15/2022    WBC 5.18 08/15/2022    RBC 4.78 08/15/2022    HGB 14.1 08/15/2022    HCT 41.7 08/15/2022    MCV 87 08/15/2022    MCH 29.5 08/15/2022    MCHC 33.8 08/15/2022     Lab Results   Component Value Date    RDW 11.9 08/15/2022     08/15/2022    MPV 8.7 (L) 08/15/2022    GRAN 3.3 08/15/2022    GRAN 63.7 08/15/2022    LYMPH 1.4 08/15/2022    LYMPH 27.4 08/15/2022    MONO 0.4 08/15/2022    MONO 7.7 08/15/2022    EOSINOPHIL 0.4 08/15/2022    BASOPHIL 0.4  08/15/2022    EOS 0.0 08/15/2022    BASO 0.02 08/15/2022    ADENIKE Positive (A) 09/18/2013    CHOL 193 01/31/2022    TRIG 113 01/31/2022    HDL 63 01/31/2022    CHOLHDL 3.1 01/31/2022    ALBUMIN 4.0 08/15/2022    AST 26 08/15/2022    ALT 26 08/15/2022    ALKPHOS 43 (L) 08/15/2022    MG 2.0 01/09/2020    LABPROT 10.5 08/15/2022    INR 1.0 08/15/2022       Assessment and Plan:  Liliam Woodson is a 67 y.o. female with a history of heavy alcohol in the past and was told she has cirrhosis. Current recommendations:  1. Cirrhosis, likely from previous alcohol; serologic w/u negative for other causes of cirrhosis. Monitor meld labs every 6 months; screen for HCC every 6 months with imaging; recommend repeat MRI now, will obtain EGD doen 7/22; vaccinate for hepatitis A and B  2. Colorectal ca screening: by report had colonoscopy 6 years ago with no polyps; will obtain colonoscopy report form 7/22     Return 6 months

## 2022-09-16 ENCOUNTER — HOSPITAL ENCOUNTER (OUTPATIENT)
Dept: RADIOLOGY | Facility: HOSPITAL | Age: 67
Discharge: HOME | End: 2022-09-16
Attending: INTERNAL MEDICINE
Payer: MEDICARE

## 2022-09-16 DIAGNOSIS — K74.69 OTHER CIRRHOSIS OF LIVER: ICD-10-CM

## 2022-09-16 DIAGNOSIS — K76.9 LIVER LESION: ICD-10-CM

## 2022-09-16 PROCEDURE — 25500020 PHARM REV CODE 255: Performed by: INTERNAL MEDICINE

## 2022-09-16 PROCEDURE — 74183 MRI ABD W/O CNTR FLWD CNTR: CPT | Mod: 26,,, | Performed by: RADIOLOGY

## 2022-09-16 PROCEDURE — 74183 MRI ABD W/O CNTR FLWD CNTR: CPT | Mod: TC

## 2022-09-16 PROCEDURE — 74183 MRI ABDOMEN W WO CONTRAST: ICD-10-PCS | Mod: 26,,, | Performed by: RADIOLOGY

## 2022-09-16 PROCEDURE — A9581 GADOXETATE DISODIUM INJ: HCPCS | Performed by: INTERNAL MEDICINE

## 2022-09-16 RX ADMIN — GADOXETATE DISODIUM 10 ML: 181.43 INJECTION, SOLUTION INTRAVENOUS at 09:09

## 2023-03-03 ENCOUNTER — TELEPHONE (OUTPATIENT)
Dept: HEPATOLOGY | Facility: CLINIC | Age: 68
End: 2023-03-03
Payer: COMMERCIAL

## 2023-03-03 ENCOUNTER — LAB VISIT (OUTPATIENT)
Dept: LAB | Facility: HOSPITAL | Age: 68
End: 2023-03-03
Attending: INTERNAL MEDICINE
Payer: MEDICARE

## 2023-03-03 DIAGNOSIS — K74.60 HEPATIC CIRRHOSIS, UNSPECIFIED HEPATIC CIRRHOSIS TYPE, UNSPECIFIED WHETHER ASCITES PRESENT: ICD-10-CM

## 2023-03-03 DIAGNOSIS — K74.69 OTHER CIRRHOSIS OF LIVER: Primary | ICD-10-CM

## 2023-03-03 DIAGNOSIS — R79.0 LOW FERRITIN: ICD-10-CM

## 2023-03-03 DIAGNOSIS — K74.69 OTHER CIRRHOSIS OF LIVER: ICD-10-CM

## 2023-03-03 LAB
AFP SERPL-MCNC: <2 NG/ML (ref 0–8.4)
ALBUMIN SERPL BCP-MCNC: 3.9 G/DL (ref 3.5–5.2)
ALP SERPL-CCNC: 42 U/L (ref 55–135)
ALT SERPL W/O P-5'-P-CCNC: 22 U/L (ref 10–44)
AMMONIA PLAS-SCNC: 25 UMOL/L (ref 10–50)
ANION GAP SERPL CALC-SCNC: 10 MMOL/L (ref 8–16)
AST SERPL-CCNC: 31 U/L (ref 10–40)
BASOPHILS # BLD AUTO: 0.01 K/UL (ref 0–0.2)
BASOPHILS NFR BLD: 0.2 % (ref 0–1.9)
BILIRUB DIRECT SERPL-MCNC: 0.1 MG/DL (ref 0.1–0.3)
BILIRUB DIRECT SERPL-MCNC: 0.1 MG/DL (ref 0.1–0.3)
BILIRUB SERPL-MCNC: 0.2 MG/DL (ref 0.1–1)
BUN SERPL-MCNC: 13 MG/DL (ref 8–23)
CALCIUM SERPL-MCNC: 9.3 MG/DL (ref 8.7–10.5)
CHLORIDE SERPL-SCNC: 103 MMOL/L (ref 95–110)
CO2 SERPL-SCNC: 27 MMOL/L (ref 23–29)
CREAT SERPL-MCNC: 0.7 MG/DL (ref 0.5–1.4)
DIFFERENTIAL METHOD: NORMAL
EOSINOPHIL # BLD AUTO: 0.1 K/UL (ref 0–0.5)
EOSINOPHIL NFR BLD: 1.5 % (ref 0–8)
ERYTHROCYTE [DISTWIDTH] IN BLOOD BY AUTOMATED COUNT: 11.9 % (ref 11.5–14.5)
EST. GFR  (NO RACE VARIABLE): >60 ML/MIN/1.73 M^2
FERRITIN SERPL-MCNC: 77 NG/ML (ref 20–300)
GLUCOSE SERPL-MCNC: 100 MG/DL (ref 70–110)
HCT VFR BLD AUTO: 37.4 % (ref 37–48.5)
HGB BLD-MCNC: 12.4 G/DL (ref 12–16)
IMM GRANULOCYTES # BLD AUTO: 0.01 K/UL (ref 0–0.04)
IMM GRANULOCYTES NFR BLD AUTO: 0.2 % (ref 0–0.5)
INR PPP: 1 (ref 0.8–1.2)
IRON SERPL-MCNC: 34 UG/DL (ref 30–160)
LYMPHOCYTES # BLD AUTO: 1.7 K/UL (ref 1–4.8)
LYMPHOCYTES NFR BLD: 31.4 % (ref 18–48)
MCH RBC QN AUTO: 30.7 PG (ref 27–31)
MCHC RBC AUTO-ENTMCNC: 33.2 G/DL (ref 32–36)
MCV RBC AUTO: 93 FL (ref 82–98)
MONOCYTES # BLD AUTO: 0.5 K/UL (ref 0.3–1)
MONOCYTES NFR BLD: 8.3 % (ref 4–15)
NEUTROPHILS # BLD AUTO: 3.2 K/UL (ref 1.8–7.7)
NEUTROPHILS NFR BLD: 58.4 % (ref 38–73)
NRBC BLD-RTO: 0 /100 WBC
PLATELET # BLD AUTO: 157 K/UL (ref 150–450)
PMV BLD AUTO: 9.3 FL (ref 9.2–12.9)
POTASSIUM SERPL-SCNC: 4.2 MMOL/L (ref 3.5–5.1)
PROT SERPL-MCNC: 7 G/DL (ref 6–8.4)
PROTHROMBIN TIME: 10.8 SEC (ref 9–12.5)
RBC # BLD AUTO: 4.04 M/UL (ref 4–5.4)
SATURATED IRON: 11 % (ref 20–50)
SODIUM SERPL-SCNC: 140 MMOL/L (ref 136–145)
TOTAL IRON BINDING CAPACITY: 320 UG/DL (ref 250–450)
TRANSFERRIN SERPL-MCNC: 216 MG/DL (ref 200–375)
WBC # BLD AUTO: 5.42 K/UL (ref 3.9–12.7)

## 2023-03-03 PROCEDURE — 84466 ASSAY OF TRANSFERRIN: CPT | Performed by: INTERNAL MEDICINE

## 2023-03-03 PROCEDURE — 82105 ALPHA-FETOPROTEIN SERUM: CPT | Performed by: INTERNAL MEDICINE

## 2023-03-03 PROCEDURE — 85610 PROTHROMBIN TIME: CPT | Performed by: INTERNAL MEDICINE

## 2023-03-03 PROCEDURE — 80321 ALCOHOLS BIOMARKERS 1OR 2: CPT | Performed by: INTERNAL MEDICINE

## 2023-03-03 PROCEDURE — 36415 COLL VENOUS BLD VENIPUNCTURE: CPT | Performed by: INTERNAL MEDICINE

## 2023-03-03 PROCEDURE — 82728 ASSAY OF FERRITIN: CPT | Performed by: INTERNAL MEDICINE

## 2023-03-03 PROCEDURE — 80053 COMPREHEN METABOLIC PANEL: CPT | Performed by: INTERNAL MEDICINE

## 2023-03-03 PROCEDURE — 85025 COMPLETE CBC W/AUTO DIFF WBC: CPT | Performed by: INTERNAL MEDICINE

## 2023-03-03 PROCEDURE — 82140 ASSAY OF AMMONIA: CPT | Performed by: INTERNAL MEDICINE

## 2023-03-03 PROCEDURE — 82248 BILIRUBIN DIRECT: CPT | Performed by: INTERNAL MEDICINE

## 2023-03-03 NOTE — TELEPHONE ENCOUNTER
----- Message from Amairani Sanz sent at 3/3/2023 11:05 AM CST -----  Regarding: Speak to staff  Contact: lisa Meek is calling to speak to staff in regards to blood work for appt on 03/06/2023. Please advise.  Requesting a call back.        [phone] (home)

## 2023-03-06 ENCOUNTER — OFFICE VISIT (OUTPATIENT)
Dept: HEPATOLOGY | Facility: CLINIC | Age: 68
End: 2023-03-06
Payer: MEDICARE

## 2023-03-06 VITALS
TEMPERATURE: 98 F | HEART RATE: 78 BPM | BODY MASS INDEX: 23.37 KG/M2 | HEIGHT: 62 IN | WEIGHT: 127 LBS | OXYGEN SATURATION: 100 % | DIASTOLIC BLOOD PRESSURE: 56 MMHG | SYSTOLIC BLOOD PRESSURE: 122 MMHG

## 2023-03-06 DIAGNOSIS — K76.9 LIVER LESION: ICD-10-CM

## 2023-03-06 DIAGNOSIS — K74.69 OTHER CIRRHOSIS OF LIVER: ICD-10-CM

## 2023-03-06 DIAGNOSIS — K76.0 FATTY LIVER: Primary | ICD-10-CM

## 2023-03-06 PROCEDURE — 99214 PR OFFICE/OUTPT VISIT, EST, LEVL IV, 30-39 MIN: ICD-10-PCS | Mod: S$PBB,,, | Performed by: INTERNAL MEDICINE

## 2023-03-06 PROCEDURE — 99999 PR PBB SHADOW E&M-EST. PATIENT-LVL IV: CPT | Mod: PBBFAC,,, | Performed by: INTERNAL MEDICINE

## 2023-03-06 PROCEDURE — 99214 OFFICE O/P EST MOD 30 MIN: CPT | Mod: PBBFAC,PN | Performed by: INTERNAL MEDICINE

## 2023-03-06 PROCEDURE — 99999 PR PBB SHADOW E&M-EST. PATIENT-LVL IV: ICD-10-PCS | Mod: PBBFAC,,, | Performed by: INTERNAL MEDICINE

## 2023-03-06 PROCEDURE — 99214 OFFICE O/P EST MOD 30 MIN: CPT | Mod: S$PBB,,, | Performed by: INTERNAL MEDICINE

## 2023-03-06 NOTE — PATIENT INSTRUCTIONS
Abdo US now and then labs and abdo US in 6 months  Return 6 months  Will again try to obtain EGD/colonoscopy done 7/22 (done at [facility])

## 2023-03-06 NOTE — PROGRESS NOTES
HEPATOLOGY FOLLOW UP    Referring Physician: Corinne Leonard NP  Current Corresponding Physician: Corinne Leonard NP, Gloria Navarrete III, MD    HPI  Liliam Woodson is here for follow up of cirrhosis. She is a 68 y.o. female with a hx of OA, HTN, thyroid disease, HLD, Sjogren's syndrome who had a ct scan done that suggested a fatty liver and cirrhosis. I saw her in consultation 8/18/21:     Ct abdo/pelvis with contrast 7/15/21: Cirrhotic liver with mild fatty infiltration, changes of confluent fibrosis, hepatic cyst and hemangioma.  No suspicious liver lesion.  MRI abdo 7/28/21: The liver has a diffusely nodular contour.  There is mild fatty infiltration as demonstrated on the in and out of phase gradient echo images.  There are multiple linear bands of low T1 high T2 signal extending through the superior right lobe most consistent with confluent fibrosis.  There is retraction of the overlying hepatic capsule.  There is delayed enhancement postcontrast.  Along the margin of the lateral segment of the left lobe of the liver there is a a 12 mm oval circumscribed mass of high T2, low T1 signal.  This demonstrates peripheral enhancement on the earlier postcontrast images and becomes almost entirely high in signal on the delayed post-contrast images, series 1901, image 21.  This is likely a hemangioma.  Of note, a hemangioma was demonstrated in this location on ultrasound on 09/16/2019.  There is a nonenhancing 10 mm cyst in the right posterior lobe series 501, image 16.  No other focal liver lesions are present.  Specifically, no foci of arterial phase enhancement and washout are seen.     Labs 7/14/21: Tbil 0.4, ALT 32, AST 31, ALKP 46, creat 0.9, Na 144, plts 209  2013: HCV Ab-, HBsAg-  ADENIKE previously positive, now negative    Serologic w/u for cirrhosis: HBsAg-, HBcAb-, HBsAb-, HAV IgG-, HCV Ab-, HIV-, ADENIKE-, ASMA-, AMA-, IgG normal, alpha 1 antitrypsin normal, iron sat 16%, ferritin 45     The patient denied any symptoms of  decompensated cirrhosis, including no ascites or edema, cognitive problems that would suggest hepatic encephalopathy, or GI bleeding from varices. Does have a history of heavy alcohol. She has been sober for 30 years. But she was told had cirrhosis 30 years ago. She describes a distended abdomen (?ascites-but no paracentesis).    Interval History  Since Liliam Woodson's last few visits:    MRI abdo w wo contrast 11/15/21:  Is is there is a cyst seen within the left lobe of the liver unchanged from prior exam; no mention of liver solid lesion - message to reading radiologist to confirm absence  MRI abdo w wo contrast 2/14/22: The liver has a nodular contour in keeping with cirrhosis.  There is unchanged heterogeneous T2 hyperintense signal abnormality at the dome of the liver, stable since at least 07/28/2021.  A 1.2 cm simple cyst of a segment 5 is present.  Within the lateral segment of the left hepatic lobe, there is a 1.2 cm T2 hyperintense finding exhibiting some nodular peripheral enhancement favoring hemangioma and showing no significant change.  There is no new focal hepatic lesion.  MRI abdo w wo EOVIST 8/15/22: ND since no eovist at the facility she went to  MRI abdo w wop eovist 9/16/22: Liver: Nodular hepatic contour suggestive cirrhosis.  Stable heterogeneous T2 hyperintensity along the hepatic dome and right hepatic lobe, stable since 07/28/2021; There is T2 hyperintense and T1 hypointense lesions within left hepatic lobe and right hepatic lobe measuring 0.9 cm and 1.0 cm, respectively, without corresponding enhancement, lesions favored to represent cysts; There is 0.9 cm T2 hyperintense focus within segment 5, difficult to assess for enhancement due to clips artifact in T1 images.  However, it favored to represent cysts.     Subcentimeter arterial enhancement in left and right hepatic lobes (series 8, image 48 and 32) without washout or pseudo capsule, nonspecific and could represent flash filling  hemangiomas, transient perfusion differences,etc    EGD: to screen for EV 7/7/22 at [facility]; no EV by report  Colonoscopy 7/7/22: at [facility]- small polyp by report    Labs 3/3/23: Plts 157, Na 140, creat 0.7, Tbil 0.3, ALT 22, AST 31, INR 1.0  AFP 3/3/23: <2  3/2/23: ferritin 77, iron sat 11% but not anemic-hemoglobin 12.4    MELD-Na score: 6 at 3/3/2023  2:27 PM  MELD score: 6 at 3/3/2023  2:27 PM  Calculated from:  Serum Creatinine: 0.7 mg/dL (Using min of 1 mg/dL) at 3/3/2023  2:26 PM  Serum Sodium: 140 mmol/L (Using max of 137 mmol/L) at 3/3/2023  2:26 PM  Total Bilirubin: 0.2 mg/dL (Using min of 1 mg/dL) at 3/3/2023  2:26 PM  INR(ratio): 1.0 at 3/3/2023  2:27 PM  Age: 68 years    Outpatient Encounter Medications as of 3/6/2023   Medication Sig Dispense Refill    cholecalciferol, vitamin D3, 1,000 unit capsule Take 2 capsules (2,000 Units total) by mouth once daily.  0    cyclobenzaprine (FLEXERIL) 5 MG tablet       famotidine (PEPCID) 40 MG tablet Take 40 mg by mouth nightly.      buPROPion (WELLBUTRIN XL) 150 MG TB24 tablet TAKE ONE (1) TABLET BY MOUTH EVERY DAY TAKE WITH WELLBUTRIN 300XL 90 tablet 1    naproxen (NAPROSYN) 375 MG tablet        No facility-administered encounter medications on file as of 3/6/2023.     Review of patient's allergies indicates:   Allergen Reactions    Lyrica [pregabalin] Nausea And Vomiting    Pcn [penicillins] Rash     Past Medical History:   Diagnosis Date    Allergy     Anterolisthesis     Asthma     Bulging lumbar disc     Cervical spondylosis     Cirrhosis 8/18/2021    Colon polyp, hyperplastic 07/26/2010    Compression fx, thoracic spine     DDD (degenerative disc disease), lumbar     Depression     Fatty liver 8/18/2021    Fibromyalgia     Gastritis 07/26/2010    Hepatic cirrhosis 8/18/2021    Liver lesion 8/18/2021    Multiple thyroid nodules 4/18/2022    Osteopenia 02/15/2018    Sjogren's syndrome     Thyroid disease     Thyroid nodule        Review of  Systems   Constitutional: Negative.    HENT: Negative.     Eyes: Negative.    Respiratory: Negative.     Cardiovascular: Negative.    Gastrointestinal: Negative.    Genitourinary: Negative.    Musculoskeletal: Negative.    Skin: Negative.    Neurological: Negative.    Psychiatric/Behavioral: Negative.     Vitals:    03/06/23 0914   BP: (!) 122/56   Pulse: 78   Temp: 98.2 °F (36.8 °C)     Physical Exam  Vitals reviewed.   Constitutional:       Appearance: She is well-developed.   HENT:      Head: Normocephalic and atraumatic.   Eyes:      General: No scleral icterus.     Conjunctiva/sclera: Conjunctivae normal.      Pupils: Pupils are equal, round, and reactive to light.   Neck:      Thyroid: No thyromegaly.   Cardiovascular:      Rate and Rhythm: Normal rate and regular rhythm.      Heart sounds: Normal heart sounds.   Pulmonary:      Effort: Pulmonary effort is normal.      Breath sounds: Normal breath sounds. No rales.   Abdominal:      General: Bowel sounds are normal. There is no distension.      Palpations: Abdomen is soft. There is no mass.      Tenderness: There is no abdominal tenderness.   Musculoskeletal:         General: Normal range of motion.      Cervical back: Normal range of motion and neck supple.   Skin:     General: Skin is warm and dry.      Findings: No rash.   Neurological:      Mental Status: She is alert and oriented to person, place, and time.       MELD-Na score: 6 at 3/3/2023  2:27 PM  MELD score: 6 at 3/3/2023  2:27 PM  Calculated from:  Serum Creatinine: 0.7 mg/dL (Using min of 1 mg/dL) at 3/3/2023  2:26 PM  Serum Sodium: 140 mmol/L (Using max of 137 mmol/L) at 3/3/2023  2:26 PM  Total Bilirubin: 0.2 mg/dL (Using min of 1 mg/dL) at 3/3/2023  2:26 PM  INR(ratio): 1.0 at 3/3/2023  2:27 PM  Age: 68 years    Lab Results   Component Value Date     03/03/2023    BUN 13 03/03/2023    CREATININE 0.7 03/03/2023    CALCIUM 9.3 03/03/2023     03/03/2023    K 4.2 03/03/2023      03/03/2023    PROT 7.0 03/03/2023    CO2 27 03/03/2023    ANIONGAP 10 03/03/2023    WBC 5.42 03/03/2023    RBC 4.04 03/03/2023    HGB 12.4 03/03/2023    HCT 37.4 03/03/2023    MCV 93 03/03/2023    MCH 30.7 03/03/2023    MCHC 33.2 03/03/2023     Lab Results   Component Value Date    RDW 11.9 03/03/2023     03/03/2023    MPV 9.3 03/03/2023    GRAN 3.2 03/03/2023    GRAN 58.4 03/03/2023    LYMPH 1.7 03/03/2023    LYMPH 31.4 03/03/2023    MONO 0.5 03/03/2023    MONO 8.3 03/03/2023    EOSINOPHIL 1.5 03/03/2023    BASOPHIL 0.2 03/03/2023    EOS 0.1 03/03/2023    BASO 0.01 03/03/2023    ADENIKE Positive (A) 09/18/2013    CHOL 191 08/19/2022    TRIG 84 08/19/2022    HDL 64 08/19/2022    CHOLHDL 3.0 08/19/2022    ALBUMIN 3.9 03/03/2023    BILIDIR 0.1 03/03/2023    BILIDIR 0.1 03/03/2023    AST 31 03/03/2023    ALT 22 03/03/2023    ALKPHOS 42 (L) 03/03/2023    MG 2.0 01/09/2020    LABPROT 10.8 03/03/2023    INR 1.0 03/03/2023       Assessment and Plan:  Liliam Woodson is a 67 y.o. female with a history of heavy alcohol in the past and was told she has cirrhosis. Current recommendations:  1. Cirrhosis, likely from previous alcohol; serologic w/u negative for other causes of cirrhosis. Monitor meld labs every 6 months (due next 09/23); screen for HCC every 6 months with imaging; only cysts seen on MRI 09/22, abdo US now (3/23); never received EGD from 7/22  2. Colorectal ca screening: by report had colonoscopy 6 years ago with no polyps; will obtain colonoscopy report form 7/22     Return 6 months

## 2023-03-08 LAB
CLINICAL BIOCHEMIST REVIEW: NORMAL
PLPETH BLD-MCNC: NORMAL NG/ML
POPETH BLD-MCNC: <10 NG/ML

## 2023-03-27 ENCOUNTER — HOSPITAL ENCOUNTER (OUTPATIENT)
Dept: RADIOLOGY | Facility: HOSPITAL | Age: 68
Discharge: HOME | End: 2023-03-27
Attending: INTERNAL MEDICINE
Payer: MEDICARE

## 2023-03-27 DIAGNOSIS — K74.69 OTHER CIRRHOSIS OF LIVER: ICD-10-CM

## 2023-03-27 PROCEDURE — 76700 US EXAM ABDOM COMPLETE: CPT | Mod: TC

## 2023-03-27 PROCEDURE — 76700 US EXAM ABDOM COMPLETE: CPT | Mod: 26,,, | Performed by: RADIOLOGY

## 2023-03-27 PROCEDURE — 76700 US ABDOMEN COMPLETE: ICD-10-PCS | Mod: 26,,, | Performed by: RADIOLOGY

## 2023-09-06 ENCOUNTER — HOSPITAL ENCOUNTER (OUTPATIENT)
Dept: RADIOLOGY | Facility: HOSPITAL | Age: 68
Discharge: HOME | End: 2023-09-06
Attending: INTERNAL MEDICINE
Payer: MEDICARE

## 2023-09-06 DIAGNOSIS — K74.69 OTHER CIRRHOSIS OF LIVER: ICD-10-CM

## 2023-09-06 PROCEDURE — 76700 US EXAM ABDOM COMPLETE: CPT | Mod: TC

## 2023-09-06 PROCEDURE — 76700 US EXAM ABDOM COMPLETE: CPT | Mod: 26,,, | Performed by: RADIOLOGY

## 2023-09-06 PROCEDURE — 76700 US ABDOMEN COMPLETE: ICD-10-PCS | Mod: 26,,, | Performed by: RADIOLOGY

## 2023-09-18 ENCOUNTER — HOSPITAL ENCOUNTER (OUTPATIENT)
Dept: RADIOLOGY | Facility: HOSPITAL | Age: 68
Discharge: HOME | End: 2023-09-18
Payer: MEDICARE

## 2023-09-18 PROCEDURE — 77067 MAMMO DIGITAL SCREENING BILAT WITH TOMO: ICD-10-PCS | Mod: 26,,, | Performed by: RADIOLOGY

## 2023-09-18 PROCEDURE — 77063 BREAST TOMOSYNTHESIS BI: CPT | Mod: 26,,, | Performed by: RADIOLOGY

## 2023-09-18 PROCEDURE — 77067 SCR MAMMO BI INCL CAD: CPT | Mod: 26,,, | Performed by: RADIOLOGY

## 2023-09-18 PROCEDURE — 77063 MAMMO DIGITAL SCREENING BILAT WITH TOMO: ICD-10-PCS | Mod: 26,,, | Performed by: RADIOLOGY

## 2023-09-18 PROCEDURE — 77067 SCR MAMMO BI INCL CAD: CPT | Mod: TC

## 2023-10-14 ENCOUNTER — OFFICE VISIT (OUTPATIENT)
Dept: HEPATOLOGY | Facility: CLINIC | Age: 68
End: 2023-10-14
Payer: MEDICARE

## 2023-10-14 VITALS
HEIGHT: 62 IN | HEART RATE: 74 BPM | SYSTOLIC BLOOD PRESSURE: 121 MMHG | WEIGHT: 123 LBS | OXYGEN SATURATION: 99 % | BODY MASS INDEX: 22.63 KG/M2 | DIASTOLIC BLOOD PRESSURE: 59 MMHG

## 2023-10-14 DIAGNOSIS — K76.0 FATTY LIVER: Primary | ICD-10-CM

## 2023-10-14 DIAGNOSIS — K74.69 OTHER CIRRHOSIS OF LIVER: ICD-10-CM

## 2023-10-14 DIAGNOSIS — K76.9 LIVER LESION: ICD-10-CM

## 2023-10-14 PROCEDURE — 99214 PR OFFICE/OUTPT VISIT, EST, LEVL IV, 30-39 MIN: ICD-10-PCS | Mod: S$PBB,,, | Performed by: INTERNAL MEDICINE

## 2023-10-14 PROCEDURE — 99214 OFFICE O/P EST MOD 30 MIN: CPT | Mod: S$PBB,,, | Performed by: INTERNAL MEDICINE

## 2023-10-14 PROCEDURE — 99999 PR PBB SHADOW E&M-EST. PATIENT-LVL IV: ICD-10-PCS | Mod: PBBFAC,,, | Performed by: INTERNAL MEDICINE

## 2023-10-14 PROCEDURE — 99214 OFFICE O/P EST MOD 30 MIN: CPT | Mod: PBBFAC | Performed by: INTERNAL MEDICINE

## 2023-10-14 PROCEDURE — 99999 PR PBB SHADOW E&M-EST. PATIENT-LVL IV: CPT | Mod: PBBFAC,,, | Performed by: INTERNAL MEDICINE

## 2023-10-14 NOTE — PATIENT INSTRUCTIONS
repeat EGD 7/24  Labs every 6 months- next due 03/23  Abdo US every 6 months - next due 03/23  Return 6 months     1% lidocaine dorsal penile block/1% lidocaine

## 2023-10-14 NOTE — PROGRESS NOTES
HEPATOLOGY FOLLOW UP    Referring Physician: Corinne Leonard NP  Current Corresponding Physician: Corinne Leonard NP, Gloria Navarrete III, MD    HPI  Liliam Woodson is here for follow up of cirrhosis. She is a 68 y.o. female with a hx of OA, HTN, thyroid disease, HLD, Sjogren's syndrome who had a ct scan done that suggested a fatty liver and cirrhosis. I saw her in consultation 8/18/21:      Serologic w/u for cirrhosis: HBsAg-, HBcAb-, HBsAb-, HAV IgG-, HCV Ab-, HIV-, ADENIKE-, ASMA-, AMA-, IgG normal, alpha 1 antitrypsin normal, iron sat 16%, ferritin 45  Does have a history of heavy alcohol. She has been sober for 30 years. But she was told had cirrhosis 30 years ago. She describes a distended abdomen (?ascites-but no paracentesis).    Interval History  Since Liliam Woodson's last few visits:    -feeling well; denies any symptoms of decompensated cirrhosis, including no ascites or edema, cognitive problems that would suggest hepatic encephalopathy, or GI bleeding from varices.     Abdo US 9/6/23: 1.2 cm probable benign cavernous hemangioma of the left lobe of the liver.  Nodular contour of the liver suggesting cirrhosis  EGD: to screen for EV 7/7/22 at [facility]; no EV by report  Colonoscopy 7/7/22: at [facility]- small polyp by report      MELD 3.0: 7 at 9/6/2023  9:02 AM  MELD-Na: 6 at 9/6/2023  9:02 AM  Calculated from:  Serum Creatinine: 0.8 mg/dL (Using min of 1 mg/dL) at 9/6/2023  9:02 AM  Serum Sodium: 143 mmol/L (Using max of 137 mmol/L) at 9/6/2023  9:02 AM  Total Bilirubin: 0.5 mg/dL (Using min of 1 mg/dL) at 9/6/2023  9:02 AM  Serum Albumin: 4.0 g/dL (Using max of 3.5 g/dL) at 9/6/2023  9:02 AM  INR(ratio): 1.0 at 9/6/2023  9:02 AM  Age at listing (hypothetical): 68 years  Sex: Female at 9/6/2023  9:02 AM      Outpatient Encounter Medications as of 10/14/2023   Medication Sig Dispense Refill    buPROPion (WELLBUTRIN XL) 300 MG 24 hr tablet TAKE ONE TABLET BY MOUTH EVERY DAY 90 tablet 2     cholecalciferol, vitamin D3, 1,000 unit capsule Take 2 capsules (2,000 Units total) by mouth once daily.  0    cyclobenzaprine (FLEXERIL) 5 MG tablet       naproxen (NAPROSYN) 375 MG tablet Take 1 tablet (375 mg total) by mouth as needed. 30 tablet 2     No facility-administered encounter medications on file as of 10/14/2023.     Review of patient's allergies indicates:   Allergen Reactions    Lyrica [pregabalin] Nausea And Vomiting    Pcn [penicillins] Rash     Past Medical History:   Diagnosis Date    Allergy     Anterolisthesis     Asthma     Bulging lumbar disc     Cervical spondylosis     Cirrhosis 8/18/2021    Colon polyp, hyperplastic 07/26/2010    Compression fx, thoracic spine     DDD (degenerative disc disease), lumbar     Depression     Fatty liver 8/18/2021    Fibromyalgia     Gastritis 07/26/2010    Hepatic cirrhosis 8/18/2021    Liver lesion 8/18/2021    Multiple thyroid nodules 4/18/2022    Osteopenia 02/15/2018    Sjogren's syndrome     Thyroid disease     Thyroid nodule        Review of Systems   Constitutional: Negative.    HENT: Negative.     Eyes: Negative.    Respiratory: Negative.     Cardiovascular: Negative.    Gastrointestinal: Negative.    Genitourinary: Negative.    Musculoskeletal: Negative.    Skin: Negative.    Neurological: Negative.    Psychiatric/Behavioral: Negative.       Vitals:    10/14/23 0924   BP: (!) 121/59   Pulse: 74     Physical Exam  Vitals reviewed.   Constitutional:       Appearance: She is well-developed.   HENT:      Head: Normocephalic and atraumatic.   Eyes:      General: No scleral icterus.     Conjunctiva/sclera: Conjunctivae normal.      Pupils: Pupils are equal, round, and reactive to light.   Neck:      Thyroid: No thyromegaly.   Cardiovascular:      Rate and Rhythm: Normal rate and regular rhythm.      Heart sounds: Normal heart sounds.   Pulmonary:      Effort: Pulmonary effort is normal.      Breath sounds: Normal breath sounds. No rales.   Abdominal:       General: Bowel sounds are normal. There is no distension.      Palpations: Abdomen is soft. There is no mass.      Tenderness: There is no abdominal tenderness.   Musculoskeletal:         General: Normal range of motion.      Cervical back: Normal range of motion and neck supple.   Skin:     General: Skin is warm and dry.      Findings: No rash.   Neurological:      Mental Status: She is alert and oriented to person, place, and time.         MELD 3.0: 7 at 9/6/2023  9:02 AM  MELD-Na: 6 at 9/6/2023  9:02 AM  Calculated from:  Serum Creatinine: 0.8 mg/dL (Using min of 1 mg/dL) at 9/6/2023  9:02 AM  Serum Sodium: 143 mmol/L (Using max of 137 mmol/L) at 9/6/2023  9:02 AM  Total Bilirubin: 0.5 mg/dL (Using min of 1 mg/dL) at 9/6/2023  9:02 AM  Serum Albumin: 4.0 g/dL (Using max of 3.5 g/dL) at 9/6/2023  9:02 AM  INR(ratio): 1.0 at 9/6/2023  9:02 AM  Age at listing (hypothetical): 68 years  Sex: Female at 9/6/2023  9:02 AM      Lab Results   Component Value Date    GLU 82 09/06/2023    BUN 12 09/06/2023    CREATININE 0.8 09/06/2023    CALCIUM 9.2 09/06/2023     09/06/2023    K 4.5 09/06/2023     09/06/2023    PROT 6.8 09/06/2023    CO2 26 09/06/2023    ANIONGAP 8 09/06/2023    WBC 5.22 09/06/2023    RBC 4.59 09/06/2023    HGB 14.0 09/06/2023    HCT 40.9 09/06/2023    MCV 89 09/06/2023    MCH 30.5 09/06/2023    MCHC 34.2 09/06/2023     Lab Results   Component Value Date    RDW 12.6 09/06/2023     09/06/2023    MPV 9.2 09/06/2023    GRAN 3.3 09/06/2023    GRAN 63.4 09/06/2023    LYMPH 1.5 09/06/2023    LYMPH 28.5 09/06/2023    MONO 0.4 09/06/2023    MONO 7.1 09/06/2023    EOSINOPHIL 0.4 09/06/2023    BASOPHIL 0.2 09/06/2023    EOS 0.0 09/06/2023    BASO 0.01 09/06/2023    ADENIKE Positive (A) 09/18/2013    CHOL 191 08/19/2022    TRIG 84 08/19/2022    HDL 64 08/19/2022    CHOLHDL 3.0 08/19/2022    ALBUMIN 4.0 09/06/2023    BILIDIR 0.2 09/06/2023    AST 28 09/06/2023    ALT 27 09/06/2023    ALKPHOS 41 (L)  09/06/2023    MG 2.0 01/09/2020    LABPROT 10.9 09/06/2023    INR 1.0 09/06/2023       Assessment and Plan:  Liliam Woodson is a 68 y.o. female with a history of heavy alcohol in the past and was told she has cirrhosis. Current recommendations:  1. Cirrhosis, likely from previous alcohol; serologic w/u negative for other causes of cirrhosis. Monitor meld labs every 6 months (due next 03/24); screen for HCC every 6 months with imaging (due 03/24)  2. Portal htn: never received EGD from 7/22 but no varices by report; repeat EGD 7/24       Return 6 months

## 2024-01-30 PROBLEM — E04.1 THYROID NODULE: Status: RESOLVED | Noted: 2017-03-20 | Resolved: 2024-01-30

## 2024-02-02 ENCOUNTER — HOSPITAL ENCOUNTER (OUTPATIENT)
Dept: RADIOLOGY | Facility: HOSPITAL | Age: 69
Discharge: HOME | End: 2024-02-02
Attending: NURSE PRACTITIONER
Payer: MEDICARE

## 2024-02-02 DIAGNOSIS — R63.4 UNINTENTIONAL WEIGHT LOSS: ICD-10-CM

## 2024-02-02 DIAGNOSIS — Z78.0 ASYMPTOMATIC MENOPAUSAL STATE: ICD-10-CM

## 2024-02-02 DIAGNOSIS — E04.2 MULTIPLE THYROID NODULES: ICD-10-CM

## 2024-02-02 DIAGNOSIS — M85.80 OSTEOPENIA, UNSPECIFIED LOCATION: ICD-10-CM

## 2024-02-02 PROCEDURE — 71046 X-RAY EXAM CHEST 2 VIEWS: CPT | Mod: 26,,, | Performed by: RADIOLOGY

## 2024-02-02 PROCEDURE — 77080 DXA BONE DENSITY AXIAL: CPT | Mod: TC

## 2024-02-02 PROCEDURE — 77080 DXA BONE DENSITY AXIAL: CPT | Mod: 26,,, | Performed by: RADIOLOGY

## 2024-02-02 PROCEDURE — 76536 US EXAM OF HEAD AND NECK: CPT | Mod: TC

## 2024-02-02 PROCEDURE — 71046 X-RAY EXAM CHEST 2 VIEWS: CPT | Mod: TC

## 2024-02-02 PROCEDURE — 76536 US EXAM OF HEAD AND NECK: CPT | Mod: 26,,, | Performed by: RADIOLOGY

## 2024-03-18 ENCOUNTER — HOSPITAL ENCOUNTER (OUTPATIENT)
Dept: RADIOLOGY | Facility: HOSPITAL | Age: 69
Discharge: HOME | End: 2024-03-18
Attending: INTERNAL MEDICINE
Payer: MEDICARE

## 2024-03-18 DIAGNOSIS — K74.69 OTHER CIRRHOSIS OF LIVER: ICD-10-CM

## 2024-03-18 PROCEDURE — 76700 US EXAM ABDOM COMPLETE: CPT | Mod: TC

## 2024-03-18 PROCEDURE — 76700 US EXAM ABDOM COMPLETE: CPT | Mod: 26,,, | Performed by: RADIOLOGY

## 2024-09-03 ENCOUNTER — HOSPITAL ENCOUNTER (OUTPATIENT)
Dept: RADIOLOGY | Facility: HOSPITAL | Age: 69
Discharge: HOME | End: 2024-09-03
Attending: NURSE PRACTITIONER
Payer: MEDICARE

## 2024-09-03 DIAGNOSIS — M25.50 PAIN IN JOINT: ICD-10-CM

## 2024-09-03 DIAGNOSIS — M25.50 PAIN IN JOINT: Primary | ICD-10-CM

## 2024-09-03 PROCEDURE — 73562 X-RAY EXAM OF KNEE 3: CPT | Mod: 26,RT,, | Performed by: RADIOLOGY

## 2024-09-03 PROCEDURE — 73562 X-RAY EXAM OF KNEE 3: CPT | Mod: TC,RT

## 2024-09-03 PROCEDURE — 73130 X-RAY EXAM OF HAND: CPT | Mod: 26,RT,, | Performed by: RADIOLOGY

## 2024-09-03 PROCEDURE — 73130 X-RAY EXAM OF HAND: CPT | Mod: 26,LT,, | Performed by: RADIOLOGY

## 2024-09-03 PROCEDURE — 73130 X-RAY EXAM OF HAND: CPT | Mod: TC,LT

## 2024-09-03 PROCEDURE — 72050 X-RAY EXAM NECK SPINE 4/5VWS: CPT | Mod: TC

## 2024-09-03 PROCEDURE — 72050 X-RAY EXAM NECK SPINE 4/5VWS: CPT | Mod: 26,,, | Performed by: RADIOLOGY

## 2024-09-03 PROCEDURE — 73130 X-RAY EXAM OF HAND: CPT | Mod: TC,RT

## 2024-10-14 ENCOUNTER — LAB VISIT (OUTPATIENT)
Dept: LAB | Facility: HOSPITAL | Age: 69
End: 2024-10-14
Attending: INTERNAL MEDICINE
Payer: MEDICARE

## 2024-10-14 DIAGNOSIS — K74.69 OTHER CIRRHOSIS OF LIVER: ICD-10-CM

## 2024-10-14 LAB
AFP SERPL-MCNC: <2 NG/ML (ref 0–8.4)
ALBUMIN SERPL BCP-MCNC: 3.9 G/DL (ref 3.5–5.2)
ALP SERPL-CCNC: 44 U/L (ref 55–135)
ALT SERPL W/O P-5'-P-CCNC: 24 U/L (ref 10–44)
ANION GAP SERPL CALC-SCNC: 5 MMOL/L (ref 8–16)
AST SERPL-CCNC: 25 U/L (ref 10–40)
BASOPHILS # BLD AUTO: 0.02 K/UL (ref 0–0.2)
BASOPHILS NFR BLD: 0.3 % (ref 0–1.9)
BILIRUB DIRECT SERPL-MCNC: 0.1 MG/DL (ref 0.1–0.3)
BILIRUB SERPL-MCNC: 0.3 MG/DL (ref 0.1–1)
BUN SERPL-MCNC: 12 MG/DL (ref 8–23)
CALCIUM SERPL-MCNC: 9.7 MG/DL (ref 8.7–10.5)
CHLORIDE SERPL-SCNC: 108 MMOL/L (ref 95–110)
CO2 SERPL-SCNC: 26 MMOL/L (ref 23–29)
CREAT SERPL-MCNC: 0.7 MG/DL (ref 0.5–1.4)
DIFFERENTIAL METHOD BLD: NORMAL
EOSINOPHIL # BLD AUTO: 0.1 K/UL (ref 0–0.5)
EOSINOPHIL NFR BLD: 1.5 % (ref 0–8)
ERYTHROCYTE [DISTWIDTH] IN BLOOD BY AUTOMATED COUNT: 12 % (ref 11.5–14.5)
EST. GFR  (NO RACE VARIABLE): >60 ML/MIN/1.73 M^2
GLUCOSE SERPL-MCNC: 70 MG/DL (ref 70–110)
HCT VFR BLD AUTO: 40.6 % (ref 37–48.5)
HGB BLD-MCNC: 13.4 G/DL (ref 12–16)
IMM GRANULOCYTES # BLD AUTO: 0.01 K/UL (ref 0–0.04)
IMM GRANULOCYTES NFR BLD AUTO: 0.1 % (ref 0–0.5)
INR PPP: 1.1 (ref 0.8–1.2)
LYMPHOCYTES # BLD AUTO: 1.6 K/UL (ref 1–4.8)
LYMPHOCYTES NFR BLD: 22.8 % (ref 18–48)
MCH RBC QN AUTO: 30.7 PG (ref 27–31)
MCHC RBC AUTO-ENTMCNC: 33 G/DL (ref 32–36)
MCV RBC AUTO: 93 FL (ref 82–98)
MONOCYTES # BLD AUTO: 0.6 K/UL (ref 0.3–1)
MONOCYTES NFR BLD: 8.1 % (ref 4–15)
NEUTROPHILS # BLD AUTO: 4.6 K/UL (ref 1.8–7.7)
NEUTROPHILS NFR BLD: 67.2 % (ref 38–73)
NRBC BLD-RTO: 0 /100 WBC
PLATELET # BLD AUTO: 198 K/UL (ref 150–450)
PMV BLD AUTO: 9.2 FL (ref 9.2–12.9)
POTASSIUM SERPL-SCNC: 4.1 MMOL/L (ref 3.5–5.1)
PROT SERPL-MCNC: 7.1 G/DL (ref 6–8.4)
PROTHROMBIN TIME: 11.3 SEC (ref 9–12.5)
RBC # BLD AUTO: 4.36 M/UL (ref 4–5.4)
SODIUM SERPL-SCNC: 139 MMOL/L (ref 136–145)
WBC # BLD AUTO: 6.79 K/UL (ref 3.9–12.7)

## 2024-10-14 PROCEDURE — 82105 ALPHA-FETOPROTEIN SERUM: CPT | Performed by: INTERNAL MEDICINE

## 2024-10-14 PROCEDURE — 36415 COLL VENOUS BLD VENIPUNCTURE: CPT | Performed by: INTERNAL MEDICINE

## 2024-10-14 PROCEDURE — 80053 COMPREHEN METABOLIC PANEL: CPT | Performed by: INTERNAL MEDICINE

## 2024-10-14 PROCEDURE — 82248 BILIRUBIN DIRECT: CPT | Performed by: INTERNAL MEDICINE

## 2024-10-14 PROCEDURE — 85610 PROTHROMBIN TIME: CPT | Performed by: INTERNAL MEDICINE

## 2024-10-14 PROCEDURE — 85025 COMPLETE CBC W/AUTO DIFF WBC: CPT | Performed by: INTERNAL MEDICINE

## 2024-10-16 LAB
CLINICAL BIOCHEMIST REVIEW: NORMAL
PLPETH BLD-MCNC: <10 NG/ML
POPETH BLD-MCNC: <10 NG/ML

## 2024-10-18 ENCOUNTER — HOSPITAL ENCOUNTER (OUTPATIENT)
Dept: RADIOLOGY | Facility: HOSPITAL | Age: 69
Discharge: HOME | End: 2024-10-18
Attending: INTERNAL MEDICINE
Payer: MEDICARE

## 2024-10-18 DIAGNOSIS — Z12.31 ENCOUNTER FOR SCREENING MAMMOGRAM FOR BREAST CANCER: ICD-10-CM

## 2024-10-18 PROCEDURE — 77063 BREAST TOMOSYNTHESIS BI: CPT | Mod: 26,,, | Performed by: RADIOLOGY

## 2024-10-18 PROCEDURE — 77067 SCR MAMMO BI INCL CAD: CPT | Mod: TC

## 2024-10-18 PROCEDURE — 77067 SCR MAMMO BI INCL CAD: CPT | Mod: 26,,, | Performed by: RADIOLOGY

## 2024-10-18 PROCEDURE — 77063 BREAST TOMOSYNTHESIS BI: CPT | Mod: TC

## 2024-10-22 ENCOUNTER — TELEPHONE (OUTPATIENT)
Dept: ENDOSCOPY | Facility: HOSPITAL | Age: 69
End: 2024-10-22
Payer: MEDICARE

## 2024-10-22 DIAGNOSIS — F43.0 ACUTE STRESS REACTION: ICD-10-CM

## 2024-10-22 DIAGNOSIS — K74.60 HEPATIC CIRRHOSIS, UNSPECIFIED HEPATIC CIRRHOSIS TYPE, UNSPECIFIED WHETHER ASCITES PRESENT: Primary | ICD-10-CM

## 2024-10-22 NOTE — TELEPHONE ENCOUNTER
Contacted patient to schedule a EGD. Patient states she will be scheduled for this in [place]. Patient was inquiring about the order for the EGD. Informed patient if it is a [facility] facility, they will be able to see the referral in the chart.

## 2024-10-24 ENCOUNTER — PATIENT MESSAGE (OUTPATIENT)
Dept: HEPATOLOGY | Facility: CLINIC | Age: 69
End: 2024-10-24
Payer: MEDICARE

## 2024-10-26 ENCOUNTER — TELEPHONE (OUTPATIENT)
Dept: ENDOSCOPY | Facility: HOSPITAL | Age: 69
End: 2024-10-26
Payer: MEDICARE

## 2024-10-26 ENCOUNTER — PATIENT MESSAGE (OUTPATIENT)
Dept: ENDOSCOPY | Facility: HOSPITAL | Age: 69
End: 2024-10-26
Payer: MEDICARE

## 2024-10-26 VITALS — BODY MASS INDEX: 21.16 KG/M2 | WEIGHT: 115 LBS | HEIGHT: 62 IN

## 2024-10-26 NOTE — TELEPHONE ENCOUNTER
Contact and spoke with patient to schedule EGD procedure. Patient decline to schedule requesting to schedule close to home in [place] ( [place]) patient does not have transportation to [place]. My number was giving to patient to contact the office back if she is unable to schedule EGD in [place]. Patient verbalized understanding.

## 2024-10-31 ENCOUNTER — HOSPITAL ENCOUNTER (OUTPATIENT)
Dept: RADIOLOGY | Facility: HOSPITAL | Age: 69
Discharge: HOME | End: 2024-10-31
Attending: NURSE PRACTITIONER
Payer: MEDICARE

## 2024-10-31 ENCOUNTER — HOSPITAL ENCOUNTER (OUTPATIENT)
Dept: RADIOLOGY | Facility: HOSPITAL | Age: 69
Discharge: HOME | End: 2024-10-31
Attending: INTERNAL MEDICINE
Payer: MEDICARE

## 2024-10-31 DIAGNOSIS — R92.8 OTHER ABNORMAL AND INCONCLUSIVE FINDINGS ON DIAGNOSTIC IMAGING OF BREAST: ICD-10-CM

## 2024-10-31 DIAGNOSIS — K74.60 HEPATIC CIRRHOSIS, UNSPECIFIED HEPATIC CIRRHOSIS TYPE, UNSPECIFIED WHETHER ASCITES PRESENT: ICD-10-CM

## 2024-10-31 PROCEDURE — 77065 DX MAMMO INCL CAD UNI: CPT | Mod: 26,RT,, | Performed by: RADIOLOGY

## 2024-10-31 PROCEDURE — 76642 ULTRASOUND BREAST LIMITED: CPT | Mod: TC,RT

## 2024-10-31 PROCEDURE — 77061 BREAST TOMOSYNTHESIS UNI: CPT | Mod: 26,RT,, | Performed by: RADIOLOGY

## 2024-10-31 PROCEDURE — 77061 BREAST TOMOSYNTHESIS UNI: CPT | Mod: TC,RT

## 2024-10-31 PROCEDURE — 76700 US EXAM ABDOM COMPLETE: CPT | Mod: 26,,, | Performed by: RADIOLOGY

## 2024-10-31 PROCEDURE — 77065 DX MAMMO INCL CAD UNI: CPT | Mod: TC,RT

## 2024-10-31 PROCEDURE — 76700 US EXAM ABDOM COMPLETE: CPT | Mod: TC

## 2024-10-31 PROCEDURE — 76642 ULTRASOUND BREAST LIMITED: CPT | Mod: 26,RT,, | Performed by: RADIOLOGY

## 2024-11-04 ENCOUNTER — TELEPHONE (OUTPATIENT)
Dept: HEPATOLOGY | Facility: CLINIC | Age: 69
End: 2024-11-04
Payer: MEDICARE

## 2024-11-04 ENCOUNTER — OFFICE VISIT (OUTPATIENT)
Dept: HEPATOLOGY | Facility: CLINIC | Age: 69
End: 2024-11-04
Payer: MEDICARE

## 2024-11-04 DIAGNOSIS — K74.69 OTHER CIRRHOSIS OF LIVER: Primary | ICD-10-CM

## 2024-11-04 PROCEDURE — 99212 OFFICE O/P EST SF 10 MIN: CPT | Mod: PBBFAC,PN | Performed by: INTERNAL MEDICINE

## 2024-11-04 PROCEDURE — 99999 PR PBB SHADOW E&M-EST. PATIENT-LVL II: CPT | Mod: PBBFAC,,, | Performed by: INTERNAL MEDICINE

## 2024-11-04 NOTE — PROGRESS NOTES
HEPATOLOGY FOLLOW UP    Referring Physician: Corinne Leonard NP  Current Corresponding Physician: Corinne Leonard NP, Gloria Navarrete III, MD    HPI  Liliam Woodson is here for follow up of cirrhosis. She is a 69 y.o. female with a hx of OA, HTN, thyroid disease, HLD, Sjogren's syndrome who had a ct scan done that suggested a fatty liver and cirrhosis. I saw her in consultation 8/18/21:    Serologic w/u for cirrhosis: HBsAg-, HBcAb-, HBsAb-, HAV IgG-, HCV Ab-, HIV-, ADENIKE-, ASMA-, AMA-, IgG normal, alpha 1 antitrypsin normal, iron sat 16%, ferritin 45  Does have a history of heavy alcohol. She has been sober for 30 years. But she was told had cirrhosis 30 years ago. She describes a distended abdomen (?ascites-but no paracentesis).    Interval History  Since Liliam Woodson's last few visits:    -feeling well; denies any symptoms of decompensated cirrhosis, including no ascites or edema, cognitive problems that would suggest hepatic encephalopathy, or GI bleeding from varices.     Abdo US 10/31/24: Cirrhotic appearing liver with unchanged left lobe hemangioma. No splenomegaly or ascites. Prior cholecystectomy. No significant interval changes.   EGD: to screen for EV 7/7/22 at [facility]; no EV by report  Colonoscopy 7/7/22: at [facility]- small polyp by report      MELD 3.0: 8 at 10/31/2024  9:26 AM  MELD-Na: 7 at 10/31/2024  9:26 AM  Calculated from:  Serum Creatinine: 0.7 mg/dL (Using min of 1 mg/dL) at 10/31/2024  9:26 AM  Serum Sodium: 144 mmol/L (Using max of 137 mmol/L) at 10/31/2024  9:26 AM  Total Bilirubin: 0.4 mg/dL (Using min of 1 mg/dL) at 10/31/2024  9:26 AM  Serum Albumin: 4.1 g/dL (Using max of 3.5 g/dL) at 10/31/2024  9:26 AM  INR(ratio): 1.1 at 10/31/2024  9:26 AM  Age at listing (hypothetical): 69 years  Sex: Female at 10/31/2024  9:26 AM      Outpatient Encounter Medications as of 11/4/2024   Medication Sig Dispense Refill    alendronate (FOSAMAX) 70 MG tablet Take 1 tablet (70 mg total) by mouth  every 7 days. 4 tablet 11    buPROPion (WELLBUTRIN XL) 300 MG 24 hr tablet Take 1 tablet (300 mg total) by mouth once daily. 90 tablet 2    cholecalciferol, vitamin D3, 1,000 unit capsule Take 2 capsules (2,000 Units total) by mouth once daily.  0    lycopene-lutein-fruit extract (FRUIT AND VEGETABLE DAILY) 5-6-150 mg Cap Take by mouth.      naproxen (NAPROSYN) 375 MG tablet Take 1 tablet (375 mg total) by mouth as needed. 30 tablet 2     No facility-administered encounter medications on file as of 11/4/2024.     Review of patient's allergies indicates:   Allergen Reactions    Lyrica [pregabalin] Nausea And Vomiting    Pcn [penicillins] Rash     Past Medical History:   Diagnosis Date    Allergy     Anterolisthesis     Asthma     Bulging lumbar disc     Cervical spondylosis     Cirrhosis 8/18/2021    Colon polyp, hyperplastic 07/26/2010    Compression fx, thoracic spine     DDD (degenerative disc disease), lumbar     Depression     Fatty liver 8/18/2021    Fibromyalgia     Gastritis 07/26/2010    Hepatic cirrhosis 8/18/2021    Liver lesion 8/18/2021    Multiple thyroid nodules 4/18/2022    Osteopenia 02/15/2018    Sjogren's syndrome     Thyroid disease     Thyroid nodule        Review of Systems   Constitutional: Negative.    HENT: Negative.     Eyes: Negative.    Respiratory: Negative.     Cardiovascular: Negative.    Gastrointestinal: Negative.    Genitourinary: Negative.    Musculoskeletal: Negative.    Skin: Negative.    Neurological: Negative.    Psychiatric/Behavioral: Negative.       There were no vitals filed for this visit.    Physical Exam  Vitals reviewed.   Constitutional:       Appearance: She is well-developed.   HENT:      Head: Normocephalic and atraumatic.   Eyes:      General: No scleral icterus.     Conjunctiva/sclera: Conjunctivae normal.      Pupils: Pupils are equal, round, and reactive to light.   Neck:      Thyroid: No thyromegaly.   Cardiovascular:      Rate and Rhythm: Normal rate and  regular rhythm.      Heart sounds: Normal heart sounds.   Pulmonary:      Effort: Pulmonary effort is normal.      Breath sounds: Normal breath sounds. No rales.   Abdominal:      General: Bowel sounds are normal. There is no distension.      Palpations: Abdomen is soft. There is no mass.      Tenderness: There is no abdominal tenderness.   Musculoskeletal:         General: Normal range of motion.      Cervical back: Normal range of motion and neck supple.   Skin:     General: Skin is warm and dry.      Findings: No rash.   Neurological:      Mental Status: She is alert and oriented to person, place, and time.         MELD 3.0: 8 at 10/31/2024  9:26 AM  MELD-Na: 7 at 10/31/2024  9:26 AM  Calculated from:  Serum Creatinine: 0.7 mg/dL (Using min of 1 mg/dL) at 10/31/2024  9:26 AM  Serum Sodium: 144 mmol/L (Using max of 137 mmol/L) at 10/31/2024  9:26 AM  Total Bilirubin: 0.4 mg/dL (Using min of 1 mg/dL) at 10/31/2024  9:26 AM  Serum Albumin: 4.1 g/dL (Using max of 3.5 g/dL) at 10/31/2024  9:26 AM  INR(ratio): 1.1 at 10/31/2024  9:26 AM  Age at listing (hypothetical): 69 years  Sex: Female at 10/31/2024  9:26 AM      Lab Results   Component Value Date    GLU 86 10/31/2024    BUN 9 10/31/2024    CREATININE 0.7 10/31/2024    CALCIUM 9.8 10/31/2024     10/31/2024    K 4.3 10/31/2024     10/31/2024    PROT 7.0 10/31/2024    CO2 25 10/31/2024    ANIONGAP 10 10/31/2024    WBC 6.88 10/31/2024    RBC 4.53 10/31/2024    HGB 13.9 10/31/2024    HCT 41.6 10/31/2024    MCV 92 10/31/2024    MCH 30.7 10/31/2024    MCHC 33.4 10/31/2024     Lab Results   Component Value Date    RDW 12.1 10/31/2024     10/31/2024    MPV 9.3 10/31/2024    GRAN 4.5 10/31/2024    GRAN 66.1 10/31/2024    LYMPH 1.7 10/31/2024    LYMPH 24.4 10/31/2024    MONO 0.6 10/31/2024    MONO 8.1 10/31/2024    EOSINOPHIL 1.0 10/31/2024    BASOPHIL 0.1 10/31/2024    EOS 0.1 10/31/2024    BASO 0.01 10/31/2024    ADENIKE Positive (A) 09/18/2013    CHOL 162  01/22/2024    TRIG 101 01/22/2024    HDL 56 01/22/2024    CHOLHDL 2.9 01/22/2024    ALBUMIN 4.1 10/31/2024    BILIDIR 0.2 10/31/2024    AST 26 10/31/2024    ALT 23 10/31/2024    ALKPHOS 42 10/31/2024    MG 2.0 01/09/2020    LABPROT 11.2 10/31/2024    INR 1.1 10/31/2024       Assessment and Plan:  Liliam Woodson is a 69 y.o. female with a history of heavy alcohol in the past and was told she has cirrhosis. Current recommendations:  1. Cirrhosis, likely from previous alcohol; serologic w/u negative for other causes of cirrhosis. Monitor meld labs every 6 months (due next 04/25); screen for HCC every 6 months with imaging (due 04/25)  2. Portal htn: never received EGD from 7/22 but no varices by report; repeat EGD 7/24 (she will get this done in [place])      Return 6 months  A total of 35 minutes was spent reviewing the patient's chart, examining the patient, reviewing labs and imaging and coordinating care with the patient's care team.

## 2024-11-04 NOTE — Clinical Note
1. Labs and abdo US w AFP every 6 months- due next 04/25 2. EGD in [facility] 3. Return 6 months (not one year)

## 2024-11-04 NOTE — TELEPHONE ENCOUNTER
----- Message from Katya Mckee MD sent at 11/4/2024  9:46 AM CST -----  1. Labs and abdo US w AFP every 6 months- due next 04/25  3. Return 1 year     Called patient and left a message

## 2025-01-06 ENCOUNTER — HOSPITAL ENCOUNTER (OUTPATIENT)
Dept: RADIOLOGY | Facility: HOSPITAL | Age: 70
Discharge: HOME | End: 2025-01-06
Attending: NURSE PRACTITIONER
Payer: MEDICARE

## 2025-01-06 DIAGNOSIS — M25.531 PAIN IN RIGHT WRIST: Primary | ICD-10-CM

## 2025-01-06 DIAGNOSIS — M25.531 PAIN IN RIGHT WRIST: ICD-10-CM

## 2025-01-06 PROCEDURE — 73110 X-RAY EXAM OF WRIST: CPT | Mod: TC,RT

## 2025-01-06 PROCEDURE — 73110 X-RAY EXAM OF WRIST: CPT | Mod: 26,RT,, | Performed by: RADIOLOGY

## 2025-03-07 ENCOUNTER — LAB VISIT (OUTPATIENT)
Dept: LAB | Facility: HOSPITAL | Age: 70
End: 2025-03-07
Attending: INTERNAL MEDICINE
Payer: MEDICARE

## 2025-03-07 ENCOUNTER — RESULTS FOLLOW-UP (OUTPATIENT)
Dept: TRANSPLANT | Facility: CLINIC | Age: 70
End: 2025-03-07

## 2025-03-07 DIAGNOSIS — K74.69 OTHER CIRRHOSIS OF LIVER: ICD-10-CM

## 2025-03-07 LAB
AFP SERPL-MCNC: <2 NG/ML (ref 0–8.4)
ALBUMIN SERPL BCP-MCNC: 4.3 G/DL (ref 3.5–5.2)
ALP SERPL-CCNC: 39 U/L (ref 40–150)
ALT SERPL W/O P-5'-P-CCNC: 21 U/L (ref 10–44)
ANION GAP SERPL CALC-SCNC: 9 MMOL/L (ref 8–16)
AST SERPL-CCNC: 23 U/L (ref 10–40)
BASOPHILS # BLD AUTO: 0.03 K/UL (ref 0–0.2)
BASOPHILS NFR BLD: 0.4 % (ref 0–1.9)
BILIRUB DIRECT SERPL-MCNC: 0.1 MG/DL (ref 0.1–0.3)
BILIRUB SERPL-MCNC: 0.3 MG/DL (ref 0.1–1)
BUN SERPL-MCNC: 10 MG/DL (ref 8–23)
CALCIUM SERPL-MCNC: 9.6 MG/DL (ref 8.7–10.5)
CHLORIDE SERPL-SCNC: 107 MMOL/L (ref 95–110)
CO2 SERPL-SCNC: 25 MMOL/L (ref 23–29)
CREAT SERPL-MCNC: 0.8 MG/DL (ref 0.5–1.4)
DIFFERENTIAL METHOD BLD: ABNORMAL
EOSINOPHIL # BLD AUTO: 0 K/UL (ref 0–0.5)
EOSINOPHIL NFR BLD: 0.5 % (ref 0–8)
ERYTHROCYTE [DISTWIDTH] IN BLOOD BY AUTOMATED COUNT: 12.6 % (ref 11.5–14.5)
EST. GFR  (NO RACE VARIABLE): >60 ML/MIN/1.73 M^2
GLUCOSE SERPL-MCNC: 91 MG/DL (ref 70–110)
HCT VFR BLD AUTO: 43.4 % (ref 37–48.5)
HGB BLD-MCNC: 14.8 G/DL (ref 12–16)
IMM GRANULOCYTES # BLD AUTO: 0.02 K/UL (ref 0–0.04)
IMM GRANULOCYTES NFR BLD AUTO: 0.3 % (ref 0–0.5)
INR PPP: 1 (ref 0.8–1.2)
LYMPHOCYTES # BLD AUTO: 2 K/UL (ref 1–4.8)
LYMPHOCYTES NFR BLD: 25.4 % (ref 18–48)
MCH RBC QN AUTO: 30.8 PG (ref 27–31)
MCHC RBC AUTO-ENTMCNC: 34.1 G/DL (ref 32–36)
MCV RBC AUTO: 90 FL (ref 82–98)
MONOCYTES # BLD AUTO: 0.6 K/UL (ref 0.3–1)
MONOCYTES NFR BLD: 7.2 % (ref 4–15)
NEUTROPHILS # BLD AUTO: 5.1 K/UL (ref 1.8–7.7)
NEUTROPHILS NFR BLD: 66.2 % (ref 38–73)
NRBC BLD-RTO: 0 /100 WBC
PLATELET # BLD AUTO: 172 K/UL (ref 150–450)
PMV BLD AUTO: 9 FL (ref 9.2–12.9)
POTASSIUM SERPL-SCNC: 4.2 MMOL/L (ref 3.5–5.1)
PROT SERPL-MCNC: 7.6 G/DL (ref 6–8.4)
PROTHROMBIN TIME: 11.1 SEC (ref 9–12.5)
RBC # BLD AUTO: 4.8 M/UL (ref 4–5.4)
SODIUM SERPL-SCNC: 141 MMOL/L (ref 136–145)
WBC # BLD AUTO: 7.69 K/UL (ref 3.9–12.7)

## 2025-03-07 PROCEDURE — 82248 BILIRUBIN DIRECT: CPT | Performed by: INTERNAL MEDICINE

## 2025-03-07 PROCEDURE — 36415 COLL VENOUS BLD VENIPUNCTURE: CPT | Performed by: INTERNAL MEDICINE

## 2025-03-07 PROCEDURE — 80053 COMPREHEN METABOLIC PANEL: CPT | Performed by: INTERNAL MEDICINE

## 2025-03-07 PROCEDURE — 82105 ALPHA-FETOPROTEIN SERUM: CPT | Performed by: INTERNAL MEDICINE

## 2025-03-07 PROCEDURE — 85025 COMPLETE CBC W/AUTO DIFF WBC: CPT | Performed by: INTERNAL MEDICINE

## 2025-03-07 PROCEDURE — 85610 PROTHROMBIN TIME: CPT | Performed by: INTERNAL MEDICINE

## 2025-03-13 ENCOUNTER — HOSPITAL ENCOUNTER (OUTPATIENT)
Dept: RADIOLOGY | Facility: HOSPITAL | Age: 70
Discharge: HOME | End: 2025-03-13
Attending: INTERNAL MEDICINE
Payer: MEDICARE

## 2025-03-13 DIAGNOSIS — K74.69 OTHER CIRRHOSIS OF LIVER: ICD-10-CM

## 2025-03-13 PROCEDURE — 76700 US EXAM ABDOM COMPLETE: CPT | Mod: 26,,, | Performed by: RADIOLOGY

## 2025-03-13 PROCEDURE — 76700 US EXAM ABDOM COMPLETE: CPT | Mod: TC

## 2025-07-29 ENCOUNTER — LAB VISIT (OUTPATIENT)
Dept: LAB | Facility: HOSPITAL | Age: 70
End: 2025-07-29
Attending: INTERNAL MEDICINE
Payer: MEDICARE

## 2025-07-29 DIAGNOSIS — K74.69 OTHER CIRRHOSIS OF LIVER: ICD-10-CM

## 2025-07-29 LAB
ABSOLUTE EOSINOPHIL (OHS): 0.09 K/UL
ABSOLUTE MONOCYTE (OHS): 0.48 K/UL (ref 0.3–1)
ABSOLUTE NEUTROPHIL COUNT (OHS): 3.87 K/UL (ref 1.8–7.7)
AFP SERPL-MCNC: <2 NG/ML
ALBUMIN SERPL BCP-MCNC: 4 G/DL (ref 3.5–5.2)
ALP SERPL-CCNC: 41 UNIT/L (ref 40–150)
ALT SERPL W/O P-5'-P-CCNC: 24 UNIT/L (ref 10–44)
ANION GAP (OHS): 8 MMOL/L (ref 8–16)
AST SERPL-CCNC: 32 UNIT/L (ref 11–45)
BASOPHILS # BLD AUTO: 0.02 K/UL
BASOPHILS NFR BLD AUTO: 0.3 %
BILIRUB DIRECT SERPL-MCNC: 0.1 MG/DL (ref 0.1–0.3)
BILIRUB SERPL-MCNC: 0.2 MG/DL (ref 0.1–1)
BUN SERPL-MCNC: 14 MG/DL (ref 8–23)
CALCIUM SERPL-MCNC: 9.4 MG/DL (ref 8.7–10.5)
CHLORIDE SERPL-SCNC: 102 MMOL/L (ref 95–110)
CO2 SERPL-SCNC: 29 MMOL/L (ref 23–29)
CREAT SERPL-MCNC: 0.7 MG/DL (ref 0.5–1.4)
ERYTHROCYTE [DISTWIDTH] IN BLOOD BY AUTOMATED COUNT: 12 % (ref 11.5–14.5)
GFR SERPLBLD CREATININE-BSD FMLA CKD-EPI: >60 ML/MIN/1.73/M2
GLUCOSE SERPL-MCNC: 86 MG/DL (ref 70–110)
HCT VFR BLD AUTO: 38.1 % (ref 37–48.5)
HGB BLD-MCNC: 12.8 GM/DL (ref 12–16)
IMM GRANULOCYTES # BLD AUTO: 0.02 K/UL (ref 0–0.04)
IMM GRANULOCYTES NFR BLD AUTO: 0.3 % (ref 0–0.5)
INR PPP: 1 (ref 0.8–1.2)
LYMPHOCYTES # BLD AUTO: 1.98 K/UL (ref 1–4.8)
MCH RBC QN AUTO: 30.3 PG (ref 27–31)
MCHC RBC AUTO-ENTMCNC: 33.6 G/DL (ref 32–36)
MCV RBC AUTO: 90 FL (ref 82–98)
NUCLEATED RBC (/100WBC) (OHS): 0 /100 WBC
PLATELET # BLD AUTO: 178 K/UL (ref 150–450)
PMV BLD AUTO: 9.5 FL (ref 9.2–12.9)
POTASSIUM SERPL-SCNC: 3.8 MMOL/L (ref 3.5–5.1)
PROT SERPL-MCNC: 6.9 GM/DL (ref 6–8.4)
PROTHROMBIN TIME: 11.3 SECONDS (ref 9–12.5)
RBC # BLD AUTO: 4.23 M/UL (ref 4–5.4)
RELATIVE EOSINOPHIL (OHS): 1.4 %
RELATIVE LYMPHOCYTE (OHS): 30.7 % (ref 18–48)
RELATIVE MONOCYTE (OHS): 7.4 % (ref 4–15)
RELATIVE NEUTROPHIL (OHS): 59.9 % (ref 38–73)
SODIUM SERPL-SCNC: 139 MMOL/L (ref 136–145)
WBC # BLD AUTO: 6.46 K/UL (ref 3.9–12.7)

## 2025-07-29 PROCEDURE — 82105 ALPHA-FETOPROTEIN SERUM: CPT

## 2025-07-29 PROCEDURE — 36415 COLL VENOUS BLD VENIPUNCTURE: CPT

## 2025-07-29 PROCEDURE — 85025 COMPLETE CBC W/AUTO DIFF WBC: CPT

## 2025-07-29 PROCEDURE — 85610 PROTHROMBIN TIME: CPT

## 2025-07-29 PROCEDURE — 80053 COMPREHEN METABOLIC PANEL: CPT

## 2025-07-29 PROCEDURE — 82248 BILIRUBIN DIRECT: CPT
